# Patient Record
Sex: MALE | Race: BLACK OR AFRICAN AMERICAN | URBAN - METROPOLITAN AREA
[De-identification: names, ages, dates, MRNs, and addresses within clinical notes are randomized per-mention and may not be internally consistent; named-entity substitution may affect disease eponyms.]

---

## 2017-01-19 DIAGNOSIS — E78.1 HYPERTRIGLYCERIDEMIA: ICD-10-CM

## 2017-01-20 RX ORDER — SIMVASTATIN 10 MG/1
TABLET, FILM COATED ORAL
Qty: 30 TAB | Refills: 0 | Status: SHIPPED | OUTPATIENT
Start: 2017-01-20 | End: 2017-08-11 | Stop reason: SDUPTHER

## 2017-01-20 NOTE — TELEPHONE ENCOUNTER
Alan Gómez,   Please tell the patient to follow up in February. After this refill I will no longer fill his simvastatin. Thanks.   JAMIE

## 2017-02-07 ENCOUNTER — OFFICE VISIT (OUTPATIENT)
Dept: FAMILY MEDICINE CLINIC | Age: 32
End: 2017-02-07

## 2017-02-07 ENCOUNTER — HOSPITAL ENCOUNTER (OUTPATIENT)
Dept: LAB | Age: 32
Discharge: HOME | End: 2017-02-07
Payer: COMMERCIAL

## 2017-02-07 VITALS
SYSTOLIC BLOOD PRESSURE: 127 MMHG | HEART RATE: 94 BPM | BODY MASS INDEX: 47.74 KG/M2 | HEIGHT: 68 IN | OXYGEN SATURATION: 97 % | WEIGHT: 315 LBS | DIASTOLIC BLOOD PRESSURE: 87 MMHG | TEMPERATURE: 97.5 F | RESPIRATION RATE: 17 BRPM

## 2017-02-07 DIAGNOSIS — E11.69 DIABETES MELLITUS TYPE 2 IN OBESE (HCC): ICD-10-CM

## 2017-02-07 DIAGNOSIS — E66.01 MORBID OBESITY DUE TO EXCESS CALORIES (HCC): ICD-10-CM

## 2017-02-07 DIAGNOSIS — E11.9 ENCOUNTER FOR DIABETIC FOOT EXAM (HCC): ICD-10-CM

## 2017-02-07 DIAGNOSIS — E78.1 HYPERTRIGLYCERIDEMIA: ICD-10-CM

## 2017-02-07 DIAGNOSIS — E55.9 VITAMIN D DEFICIENCY: ICD-10-CM

## 2017-02-07 DIAGNOSIS — E66.9 DIABETES MELLITUS TYPE 2 IN OBESE (HCC): Primary | ICD-10-CM

## 2017-02-07 DIAGNOSIS — E66.9 DIABETES MELLITUS TYPE 2 IN OBESE (HCC): ICD-10-CM

## 2017-02-07 DIAGNOSIS — E11.69 DIABETES MELLITUS TYPE 2 IN OBESE (HCC): Primary | ICD-10-CM

## 2017-02-07 LAB
ALBUMIN SERPL BCP-MCNC: 3.7 G/DL (ref 3.4–5)
ALBUMIN/GLOB SERPL: 1 {RATIO} (ref 0.8–1.7)
ALP SERPL-CCNC: 119 U/L (ref 45–117)
ALT SERPL-CCNC: 36 U/L (ref 16–61)
ANION GAP BLD CALC-SCNC: 8 MMOL/L (ref 3–18)
AST SERPL W P-5'-P-CCNC: 15 U/L (ref 15–37)
BASOPHILS # BLD AUTO: 0 K/UL (ref 0–0.06)
BASOPHILS # BLD: 0 % (ref 0–2)
BILIRUB SERPL-MCNC: 0.6 MG/DL (ref 0.2–1)
BUN SERPL-MCNC: 12 MG/DL (ref 7–18)
BUN/CREAT SERPL: 13 (ref 12–20)
CALCIUM SERPL-MCNC: 9.3 MG/DL (ref 8.5–10.1)
CHLORIDE SERPL-SCNC: 101 MMOL/L (ref 100–108)
CO2 SERPL-SCNC: 30 MMOL/L (ref 21–32)
CREAT SERPL-MCNC: 0.9 MG/DL (ref 0.6–1.3)
DIFFERENTIAL METHOD BLD: NORMAL
EOSINOPHIL # BLD: 0.2 K/UL (ref 0–0.4)
EOSINOPHIL NFR BLD: 2 % (ref 0–5)
ERYTHROCYTE [DISTWIDTH] IN BLOOD BY AUTOMATED COUNT: 14.5 % (ref 11.6–14.5)
EST. AVERAGE GLUCOSE BLD GHB EST-MCNC: 166 MG/DL
GLOBULIN SER CALC-MCNC: 3.6 G/DL (ref 2–4)
GLUCOSE SERPL-MCNC: 187 MG/DL (ref 74–99)
HBA1C MFR BLD: 7.4 % (ref 4.2–5.6)
HCT VFR BLD AUTO: 42.7 % (ref 36–48)
HGB BLD-MCNC: 13.6 G/DL (ref 13–16)
LYMPHOCYTES # BLD AUTO: 24 % (ref 21–52)
LYMPHOCYTES # BLD: 1.7 K/UL (ref 0.9–3.6)
MCH RBC QN AUTO: 26.6 PG (ref 24–34)
MCHC RBC AUTO-ENTMCNC: 31.9 G/DL (ref 31–37)
MCV RBC AUTO: 83.4 FL (ref 74–97)
MONOCYTES # BLD: 0.6 K/UL (ref 0.05–1.2)
MONOCYTES NFR BLD AUTO: 8 % (ref 3–10)
NEUTS SEG # BLD: 4.7 K/UL (ref 1.8–8)
NEUTS SEG NFR BLD AUTO: 66 % (ref 40–73)
PLATELET # BLD AUTO: 307 K/UL (ref 135–420)
PMV BLD AUTO: 11.7 FL (ref 9.2–11.8)
POTASSIUM SERPL-SCNC: 4.4 MMOL/L (ref 3.5–5.5)
PROT SERPL-MCNC: 7.3 G/DL (ref 6.4–8.2)
RBC # BLD AUTO: 5.12 M/UL (ref 4.7–5.5)
SODIUM SERPL-SCNC: 139 MMOL/L (ref 136–145)
WBC # BLD AUTO: 7.2 K/UL (ref 4.6–13.2)

## 2017-02-07 PROCEDURE — 85025 COMPLETE CBC W/AUTO DIFF WBC: CPT | Performed by: INTERNAL MEDICINE

## 2017-02-07 PROCEDURE — 82043 UR ALBUMIN QUANTITATIVE: CPT | Performed by: INTERNAL MEDICINE

## 2017-02-07 PROCEDURE — 83036 HEMOGLOBIN GLYCOSYLATED A1C: CPT | Performed by: INTERNAL MEDICINE

## 2017-02-07 PROCEDURE — 82306 VITAMIN D 25 HYDROXY: CPT | Performed by: INTERNAL MEDICINE

## 2017-02-07 PROCEDURE — 36415 COLL VENOUS BLD VENIPUNCTURE: CPT | Performed by: INTERNAL MEDICINE

## 2017-02-07 PROCEDURE — 80061 LIPID PANEL: CPT | Performed by: INTERNAL MEDICINE

## 2017-02-07 PROCEDURE — 80053 COMPREHEN METABOLIC PANEL: CPT | Performed by: INTERNAL MEDICINE

## 2017-02-07 PROCEDURE — 82172 ASSAY OF APOLIPOPROTEIN: CPT | Performed by: INTERNAL MEDICINE

## 2017-02-07 RX ORDER — METFORMIN HYDROCHLORIDE 500 MG/1
500 TABLET ORAL
Qty: 30 TAB | Refills: 3 | Status: SHIPPED | OUTPATIENT
Start: 2017-02-07 | End: 2017-02-16 | Stop reason: SDUPTHER

## 2017-02-07 NOTE — PATIENT INSTRUCTIONS
Please contact our office if you have any questions about your visit today. 1.) Please get labs a week before next visit. 2.) Return in 3 months for follow up.      3.) Try to mix up exercising including elliptical, row machine and circuit training.     4.) They will call about referral for eye and foot doctors.

## 2017-02-07 NOTE — PROGRESS NOTES
Progress Note    Patient: Noah Howard MRN: [id number]  SSN: [id number]    YOB: 1985  Age: 28 y.o. Sex: male          Subjective:   Noah Howard is a 28 y.o. male who is here for follow up. Patient states that he has been playing flag football every Friday. He states that he has been working out regularly. He states that he has been having difficulty seeing the nutritionist. He states that he has been trying to cut out sugary diet. He states that the holidays were hard in terms of keeping with his diet. He denies any doctors visit. He has been out of his Metformin for about two weeks. Patient mentions that he eats something every two hours. He states that he has tried to cut back on seconds and also on portion sizes. Objective:     Past Medical History   Diagnosis Date    IBS (irritable bowel syndrome)       Visit Vitals    /87 (BP 1 Location: Right arm, BP Patient Position: Sitting)    Pulse 94    Temp 97.5 °F (36.4 °C) (Oral)    Resp 17    Ht 5' 8\" (1.727 m)    Wt (!) 394 lb (178.7 kg)    SpO2 97%    BMI 59.91 kg/m2         Review of Systems:  Pertinent ROS noted in HPI    Physical Exam:     GENERAL: alert, cooperative, no distress, appears stated age, morbidly obese  EYE: conjunctivae/corneas clear. PERRL, EOM's intact. Fundi benign  LYMPHATIC: Cervical, supraclavicular, and axillary nodes normal.   THROAT & NECK: normal and no erythema or exudates noted. LUNG: clear to auscultation bilaterally  HEART: regular rate and rhythm, S1, S2 normal, no murmur, click, rub or gallop  ABDOMEN: soft, non-tender. Bowel sounds normal. No masses,  no organomegaly, abnormal findings:  obese  EXTREMITIES:  extremities normal, atraumatic, no cyanosis or edema. Feet examined---no lesions. SKIN: Normal.  NEURO: Pinprick sensation adequate in both feet. Lab/Data Review:  No new labs to review     Assessment:     1.) Diabetes Mellitus Type 2: HgbA1C drawn in the office today.  His, Metformin--just 1 pill once a day--was reordered. If level is less than 6 then will transition him off of medications and he will continue lifestyle change. 2.) Hypertriglyceridemia: Lipid panel drawn in the office today. He will continue on statin in the interim. 3.) Obesity: Patient encouraged to engage in lifestyle change and to modify his exercises. 4.) Vitamin D Deficiency:  Vitamin D level drawn in the office today. 5.) Preventive: Podiatry and ophthalmology referrals placed. Labs drawn today. Patient will return in 3 months for follow up.        Plan:     Orders Placed This Encounter    METABOLIC PANEL, COMPREHENSIVE    CBC WITH AUTOMATED DIFF    HEMOGLOBIN A1C WITH EAG    LIPID CASCADE W/REFLEX APO B    VITAMIN D, 25 HYDROXY    MICROALBUMIN, UR, RAND W/ MICROALBUMIN/CREA RATIO    REFERRAL TO OPHTHALMOLOGY    REFERRAL TO PODIATRY    metFORMIN (GLUCOPHAGE) 500 mg tablet         Signed By: Kavita Mark DO     February 7, 2017

## 2017-02-07 NOTE — MR AVS SNAPSHOT
Visit Information Date & Time Provider Department Dept. Phone Encounter #  
 2/7/2017  8:30 AM Becky Latham 77 [id number] Follow-up Instructions Return in about 3 months (around 5/7/2017) for Regular Follow Up. Upcoming Health Maintenance Date Due Pneumococcal 19-64 Medium Risk (1 of 1 - PPSV23) 8/8/2005 DTaP/Tdap/Td series (1 - Tdap) 8/8/2007 EYE EXAM RETINAL OR DILATED Q1 12/4/2015 FOOT EXAM Q1 6/4/2016 MICROALBUMIN Q1 6/4/2016 HEMOGLOBIN A1C Q6M 11/19/2016 LIPID PANEL Q1 5/19/2017 Allergies as of 2/7/2017  Review Complete On: 7/9/9208 By: Timoteo Elaine LPN No Known Allergies Current Immunizations  Never Reviewed Name Date Influenza Vaccine 9/1/2014 Not reviewed this visit You Were Diagnosed With   
  
 Codes Comments Diabetes mellitus type 2 in obese (HCC)    -  Primary ICD-10-CM: E11.9, E66.9 ICD-9-CM: 250.00, 278.00 Vitamin D deficiency     ICD-10-CM: E55.9 ICD-9-CM: 268.9 Hypertriglyceridemia     ICD-10-CM: E78.1 ICD-9-CM: 272.1 Morbid obesity due to excess calories (HCC)     ICD-10-CM: E66.01 
ICD-9-CM: 278.01 En[facility]     ICD-10-CM: E11.9 ICD-9-CM: 250.00 Vitals BP Pulse Temp Resp Height(growth percentile) Weight(growth percentile) 127/87 (BP 1 Location: Right arm, BP Patient Position: Sitting) 94 97.5 °F (36.4 °C) (Oral) 17 5' 8\" (1.727 m) (!) 394 lb (178.7 kg) SpO2 BMI Smoking Status 97% 59.91 kg/m2 Never Smoker BMI and BSA Data Body Mass Index Body Surface Area  
 59.91 kg/m 2 2.93 m 2 Preferred Pharmacy Pharmacy Name Phone [facility] [address] [phone] Your Updated Medication List  
  
   
This list is accurate as of: 2/7/17  9:05 AM.  Always use your most recent med list.  
  
  
 diclofenac 1 % Gel Commonly known as:  VOLTAREN Apply 4 g to affected area four (4) times daily as needed for Pain.  
  
 glucose blood VI test strips strip Commonly known as:  blood glucose test  
Check blood sugar after largest meal of the day. metFORMIN 500 mg tablet Commonly known as:  GLUCOPHAGE Take 1 Tab by mouth daily (with breakfast). * simvastatin 10 mg tablet Commonly known as:  ZOCOR Take 1 Tab by mouth nightly. Indications: HYPERTRIGLYCERIDEMIA * simvastatin 10 mg tablet Commonly known as:  ZOCOR  
TAKE 1 TABLET BY MOUTH EVERY NIGHT AT BEDTIME FOR HYPERTRIGLYCERIDEMIA * Notice: This list has 2 medication(s) that are the same as other medications prescribed for you. Read the directions carefully, and ask your doctor or other care provider to review them with you. Prescriptions Sent to Pharmacy Refills  
 metFORMIN (GLUCOPHAGE) 500 mg tablet 3 Sig: Take 1 Tab by mouth daily (with breakfast). Class: Normal  
 Pharmacy: [address] [place] [address] #: [phone] Route: Oral  
  
We Performed the Following REFERRAL TO OPHTHALMOLOGY [REF57 Custom] Comments:  
 Please evaluate patient for diabetic eye exam.  
 REFERRAL TO PODIATRY [REF90 Custom] Comments:  
 Please evaluate patient for diabetic foot exam.  
  
Follow-up Instructions Return in about 3 months (around 5/7/2017) for Regular Follow Up. To-Do List   
 02/07/2017 Lab:  HEMOGLOBIN A1C WITH EAG   
  
 02/07/2017 Lab:  LIPID CASCADE W/REFLEX APO B   
  
 02/07/2017 Lab:  MICROALBUMIN, UR, RAND W/ MICROALBUMIN/CREA RATIO   
  
 02/07/2017 Lab:  VITAMIN D, 25 HYDROXY Around 05/08/2017 Lab:  CBC WITH AUTOMATED DIFF Around 05/08/2017 Lab:  METABOLIC PANEL, COMPREHENSIVE Referral Information Referral ID Referred By Referred To [id number] [facility] [address] Phone: [phone] Fax: [phone] Visits Status Start Date End Date 1 New Request 2/7/17 2/7/18 If your referral has a status of pending review or denied, additional information will be sent to support the outcome of this decision. Referral ID Referred By Referred To  
 [id number] [facility] [place], [address] Phone: [phone] Fax: [phone] Visits Status Start Date End Date 1 New Request 2/7/17 2/7/18 If your referral has a status of pending review or denied, additional information will be sent to support the outcome of this decision. Patient Instructions Please contact our office if you have any questions about your visit today. 1.) Please get labs a week before next visit. 2.) Return in 3 months for follow up.   
 
3.) Try to mix up exercising including elliptical, row machine and circuit training.  
 
4.) They will call about referral for eye and foot doctors. Introducing [facility]! [place] introduces [employer] patient portal. Now you can access parts of your medical record, email your doctor's office, and request medication refills online. 1. In your internet browser, go to [email or web address] 2. Click on the First Time User? Click Here link in the Sign In box. You will see the New Member Sign Up page. 3. Enter your [employer] Access Code exactly as it appears below. You will not need to use this code after youve completed the sign-up process. If you do not sign up before the expiration date, you must request a new code. · [employer] Access Code: [email or web address] Ana Paula Basurto Expires: 5/8/2017  8:58 AM 
 
4.  Enter the last four digits of your Social Security Number (xxxx) and Date of Birth (mm/dd/yyyy) as indicated and click Submit. You will be taken to the next sign-up page. 5. Create a [employer] ID. This will be your [employer] login ID and cannot be changed, so think of one that is secure and easy to remember. 6. Create a [employer] password. You can change your password at any time. 7. Enter your Password Reset Question and Answer. This can be used at a later time if you forget your password. 8. Enter your e-mail address. You will receive e-mail notification when new information is available in [address] 9. Click Sign Up. You can now view and download portions of your medical record. 10. Click the Download Summary menu link to download a portable copy of your medical information. If you have questions, please visit the Frequently Asked Questions section of the [employer] website. Remember, [employer] is NOT to be used for urgent needs. For medical emergencies, dial 911. Now available from your iPhone and Android! Please provide this summary of care documentation to your next provider. Your primary care clinician is listed as Marilin Joyce. If you have any questions after today's visit, please call [phone].

## 2017-02-07 NOTE — PROGRESS NOTES
Chief Complaint   Patient presents with    Diabetes     states that he does not check his bg    Obesity    Medication Refill     needs refill of metformin     1. Have you been to the ER, urgent care clinic since your last visit? Hospitalized since your last visit? No    2. Have you seen or consulted any other health care providers outside of the [address] [place] [address] since your last visit? Include any pap smears or colon screening.  No

## 2017-02-08 LAB
25(OH)D3 SERPL-MCNC: 19.8 NG/ML (ref 30–100)
CREAT UR-MCNC: 136.27 MG/DL (ref 30–125)
MICROALBUMIN UR-MCNC: 6.1 MG/DL (ref 0–3)
MICROALBUMIN/CREAT UR-RTO: 45 MG/G (ref 0–30)

## 2017-02-11 LAB
APO B SERPL-MCNC: 81 MG/DL (ref 52–135)
CHOLEST SERPL-MCNC: 142 MG/DL (ref 100–199)
HDLC SERPL-MCNC: 39 MG/DL
LDLC SERPL CALC-MCNC: 70 MG/DL (ref 0–99)
LDLC/HDLC SERPL: 1.8 RATIO UNITS (ref 0–3.6)
NONHDLC SERPL-MCNC: 103 MG/DL (ref 0–129)
TRIGL SERPL-MCNC: 166 MG/DL (ref 0–149)

## 2017-02-16 ENCOUNTER — TELEPHONE (OUTPATIENT)
Dept: FAMILY MEDICINE CLINIC | Age: 32
End: 2017-02-16

## 2017-02-16 DIAGNOSIS — E11.69 DIABETES MELLITUS TYPE 2 IN OBESE (HCC): ICD-10-CM

## 2017-02-16 DIAGNOSIS — E66.9 DIABETES MELLITUS TYPE 2 IN OBESE (HCC): ICD-10-CM

## 2017-02-16 RX ORDER — METFORMIN HYDROCHLORIDE 500 MG/1
500 TABLET ORAL 2 TIMES DAILY WITH MEALS
Qty: 30 TAB | Refills: 3 | Status: SHIPPED | OUTPATIENT
Start: 2017-02-16 | End: 2017-05-12 | Stop reason: SDUPTHER

## 2017-02-16 NOTE — TELEPHONE ENCOUNTER
Alan Gómez,   Please let patient know that his A1C shot up to 7.4. I want him to go back to twice a day metformin. I will also order an additional thirty pills so that he can have enough to last a month. Thanks.     JAMIE

## 2017-02-17 ENCOUNTER — TELEPHONE (OUTPATIENT)
Dept: FAMILY MEDICINE CLINIC | Age: 32
End: 2017-02-17

## 2017-02-17 NOTE — TELEPHONE ENCOUNTER
Attempted to contact pt at  number, no answer. Lvm for pt to return call to office at [phone]. Will continue to try to contact pt.

## 2017-03-14 DIAGNOSIS — E11.69 DIABETES MELLITUS TYPE 2 IN OBESE (HCC): Primary | ICD-10-CM

## 2017-03-14 DIAGNOSIS — E66.9 DIABETES MELLITUS TYPE 2 IN OBESE (HCC): Primary | ICD-10-CM

## 2017-03-14 RX ORDER — LANCETS
EACH MISCELLANEOUS
Qty: 100 EACH | Refills: 11 | Status: SHIPPED | OUTPATIENT
Start: 2017-03-14

## 2017-05-12 DIAGNOSIS — E66.9 DIABETES MELLITUS TYPE 2 IN OBESE (HCC): ICD-10-CM

## 2017-05-12 DIAGNOSIS — E11.69 DIABETES MELLITUS TYPE 2 IN OBESE (HCC): ICD-10-CM

## 2017-05-15 RX ORDER — METFORMIN HYDROCHLORIDE 500 MG/1
TABLET ORAL
Qty: 30 TAB | Refills: 0 | Status: SHIPPED | OUTPATIENT
Start: 2017-05-15 | End: 2017-08-11 | Stop reason: DRUGHIGH

## 2017-08-11 ENCOUNTER — HOSPITAL ENCOUNTER (OUTPATIENT)
Dept: LAB | Age: 32
Discharge: HOME | End: 2017-08-11
Payer: COMMERCIAL

## 2017-08-11 ENCOUNTER — OFFICE VISIT (OUTPATIENT)
Dept: FAMILY MEDICINE CLINIC | Age: 32
End: 2017-08-11

## 2017-08-11 VITALS
HEART RATE: 85 BPM | HEIGHT: 68 IN | BODY MASS INDEX: 47.74 KG/M2 | DIASTOLIC BLOOD PRESSURE: 81 MMHG | SYSTOLIC BLOOD PRESSURE: 138 MMHG | WEIGHT: 315 LBS | OXYGEN SATURATION: 99 % | TEMPERATURE: 97.2 F

## 2017-08-11 DIAGNOSIS — E55.9 VITAMIN D DEFICIENCY: ICD-10-CM

## 2017-08-11 DIAGNOSIS — E11.69 DIABETES MELLITUS TYPE 2 IN OBESE (HCC): ICD-10-CM

## 2017-08-11 DIAGNOSIS — E11.69 DIABETES MELLITUS TYPE 2 IN OBESE (HCC): Primary | ICD-10-CM

## 2017-08-11 DIAGNOSIS — E78.1 HYPERTRIGLYCERIDEMIA: ICD-10-CM

## 2017-08-11 DIAGNOSIS — E66.01 MORBID OBESITY DUE TO EXCESS CALORIES (HCC): ICD-10-CM

## 2017-08-11 DIAGNOSIS — E66.9 DIABETES MELLITUS TYPE 2 IN OBESE (HCC): Primary | ICD-10-CM

## 2017-08-11 DIAGNOSIS — E66.9 DIABETES MELLITUS TYPE 2 IN OBESE (HCC): ICD-10-CM

## 2017-08-11 LAB
ALBUMIN SERPL BCP-MCNC: 3.6 G/DL (ref 3.4–5)
ALBUMIN/GLOB SERPL: 0.9 {RATIO} (ref 0.8–1.7)
ALP SERPL-CCNC: 120 U/L (ref 45–117)
ALT SERPL-CCNC: 36 U/L (ref 16–61)
ANION GAP BLD CALC-SCNC: 9 MMOL/L (ref 3–18)
AST SERPL W P-5'-P-CCNC: 15 U/L (ref 15–37)
BASOPHILS # BLD AUTO: 0 K/UL (ref 0–0.06)
BASOPHILS # BLD: 1 % (ref 0–2)
BILIRUB SERPL-MCNC: 0.7 MG/DL (ref 0.2–1)
BUN SERPL-MCNC: 12 MG/DL (ref 7–18)
BUN/CREAT SERPL: 12 (ref 12–20)
CALCIUM SERPL-MCNC: 8.7 MG/DL (ref 8.5–10.1)
CHLORIDE SERPL-SCNC: 100 MMOL/L (ref 100–108)
CHOLEST SERPL-MCNC: 131 MG/DL
CO2 SERPL-SCNC: 27 MMOL/L (ref 21–32)
CREAT SERPL-MCNC: 1.01 MG/DL (ref 0.6–1.3)
DIFFERENTIAL METHOD BLD: ABNORMAL
EOSINOPHIL # BLD: 0.1 K/UL (ref 0–0.4)
EOSINOPHIL NFR BLD: 2 % (ref 0–5)
ERYTHROCYTE [DISTWIDTH] IN BLOOD BY AUTOMATED COUNT: 14.9 % (ref 11.6–14.5)
ERYTHROCYTE [SEDIMENTATION RATE] IN BLOOD: 14 MM/HR (ref 0–15)
EST. AVERAGE GLUCOSE BLD GHB EST-MCNC: 214 MG/DL
GLOBULIN SER CALC-MCNC: 4 G/DL (ref 2–4)
GLUCOSE SERPL-MCNC: 262 MG/DL (ref 74–99)
HBA1C MFR BLD: 9.1 % (ref 4.2–5.6)
HCT VFR BLD AUTO: 43.3 % (ref 36–48)
HDLC SERPL-MCNC: 41 MG/DL (ref 40–60)
HDLC SERPL: 3.2 {RATIO} (ref 0–5)
HGB BLD-MCNC: 13.7 G/DL (ref 13–16)
LDLC SERPL CALC-MCNC: 52.6 MG/DL (ref 0–100)
LIPID PROFILE,FLP: ABNORMAL
LYMPHOCYTES # BLD AUTO: 24 % (ref 21–52)
LYMPHOCYTES # BLD: 1.8 K/UL (ref 0.9–3.6)
MCH RBC QN AUTO: 26 PG (ref 24–34)
MCHC RBC AUTO-ENTMCNC: 31.6 G/DL (ref 31–37)
MCV RBC AUTO: 82.3 FL (ref 74–97)
MONOCYTES # BLD: 0.6 K/UL (ref 0.05–1.2)
MONOCYTES NFR BLD AUTO: 8 % (ref 3–10)
NEUTS SEG # BLD: 5.2 K/UL (ref 1.8–8)
NEUTS SEG NFR BLD AUTO: 65 % (ref 40–73)
PLATELET # BLD AUTO: 271 K/UL (ref 135–420)
PMV BLD AUTO: 11.7 FL (ref 9.2–11.8)
POTASSIUM SERPL-SCNC: 4.2 MMOL/L (ref 3.5–5.5)
PROT SERPL-MCNC: 7.6 G/DL (ref 6.4–8.2)
RBC # BLD AUTO: 5.26 M/UL (ref 4.7–5.5)
SODIUM SERPL-SCNC: 136 MMOL/L (ref 136–145)
TRIGL SERPL-MCNC: 187 MG/DL (ref ?–150)
VLDLC SERPL CALC-MCNC: 37.4 MG/DL
WBC # BLD AUTO: 7.8 K/UL (ref 4.6–13.2)

## 2017-08-11 PROCEDURE — 85025 COMPLETE CBC W/AUTO DIFF WBC: CPT | Performed by: INTERNAL MEDICINE

## 2017-08-11 PROCEDURE — 80053 COMPREHEN METABOLIC PANEL: CPT | Performed by: INTERNAL MEDICINE

## 2017-08-11 PROCEDURE — 82306 VITAMIN D 25 HYDROXY: CPT | Performed by: INTERNAL MEDICINE

## 2017-08-11 PROCEDURE — 36415 COLL VENOUS BLD VENIPUNCTURE: CPT | Performed by: INTERNAL MEDICINE

## 2017-08-11 PROCEDURE — 80061 LIPID PANEL: CPT | Performed by: INTERNAL MEDICINE

## 2017-08-11 PROCEDURE — 83036 HEMOGLOBIN GLYCOSYLATED A1C: CPT | Performed by: INTERNAL MEDICINE

## 2017-08-11 PROCEDURE — 85652 RBC SED RATE AUTOMATED: CPT | Performed by: INTERNAL MEDICINE

## 2017-08-11 RX ORDER — METFORMIN HYDROCHLORIDE 1000 MG/1
1000 TABLET ORAL 2 TIMES DAILY WITH MEALS
Qty: 60 TAB | Refills: 3 | Status: SHIPPED | OUTPATIENT
Start: 2017-08-11 | End: 2017-10-27 | Stop reason: DRUGHIGH

## 2017-08-11 RX ORDER — SIMVASTATIN 10 MG/1
TABLET, FILM COATED ORAL
Qty: 30 TAB | Refills: 3 | Status: SHIPPED | OUTPATIENT
Start: 2017-08-11 | End: 2017-12-17 | Stop reason: SDUPTHER

## 2017-08-11 RX ORDER — METFORMIN HYDROCHLORIDE 500 MG/1
TABLET ORAL
Qty: 60 TAB | Refills: 3 | Status: CANCELLED | OUTPATIENT
Start: 2017-08-11

## 2017-08-11 NOTE — PROGRESS NOTES
Progress Note    Patient: Joaquina Mcclain MRN: [id number]  SSN: [id number]    YOB: 1985  Age: 33 y.o. Sex: male          Subjective:   Joaquina Mcclain is a 33 y.o. male who is here for follow up. The patient recently had a baby with his wife. The patient mentions that he is concerned about his blood sugar. He states that he is trying to make a lifestyle change. He states that he has lost about 12 lbs over the course of 1 month. He also mentions that he had a blood sugar spike over 300 mg/dL. The patient has been without his metformin since about the middle of June. He states that he has ryanne trying to be physically active---engaging in activities such as flag football. He has also started eating healthier foods and eating more vegetables. He mentions that he works out three times a week. He mentions that he works primarily with his . Patient denies any headaches, but has had increased urination. He mentions that his nocturia has been intermittent. The patient mentions that he would like to lose 100 lbs overall. He states that he would like to get to 350 lb. The patient mentions that he would like to get down to the 350 lb mercy by November of this year. Objective:     Past Medical History:   Diagnosis Date    IBS (irritable bowel syndrome)       Visit Vitals    /81    Pulse 85    Temp 97.2 °F (36.2 °C) (Oral)    Ht 5' 8\" (1.727 m)    Wt (!) 383 lb (173.7 kg)    SpO2 99%    BMI 58.23 kg/m2       Review of Systems:  Pertinent ROS noted in HPI    Physical Exam:     GENERAL: alert, cooperative, no distress, appears stated age, morbidly obese  EYE: conjunctivae/corneas clear. PERRL, EOM's intact. Fundi benign  LYMPHATIC: Cervical, supraclavicular, and axillary nodes normal.   THROAT & NECK: normal and no erythema or exudates noted.    LUNG: clear to auscultation bilaterally  HEART: regular rate and rhythm, S1, S2 normal, no murmur, click, rub or gallop  ABDOMEN: soft, non-tender. Bowel sounds normal. No masses,  no organomegaly, abnormal findings:  obese  EXTREMITIES:  extremities normal, atraumatic, no cyanosis or edema. Feet examined---no lesions. SKIN: Normal.        Lab/Data Review:  Labs ordered as noted below     Assessment:     1.) Diabetes Mellitus Type 2: Due to the fact that the patient has been off of medication for a while and has been experiencing blood sugar spikes in the 300's I will increase his metformin to 1,000 mg twice a day. He was also provided information material on proper diet and weight loss. 2.) Hypertriglyceridemia: Lipid panel drawn in the office today. Simvastatin reordered. 3.) Obesity: Patient will continue to engage in lifestyle change. 4.) Vitamin D Deficiency:  Vitamin D level drawn in the office today. 5.) Preventive: Other labs drawn today. Patient will return in 3 months for follow up.        Plan:     Orders Placed This Encounter    METABOLIC PANEL, COMPREHENSIVE    LIPID PANEL    CBC WITH AUTOMATED DIFF    SED RATE (ESR)    HEMOGLOBIN A1C WITH EAG    VITAMIN D, 25 HYDROXY    simvastatin (ZOCOR) 10 mg tablet    metFORMIN (GLUCOPHAGE) 1,000 mg tablet         Signed By: Guillermo Christianson DO     August 11, 2017

## 2017-08-11 NOTE — MR AVS SNAPSHOT
Visit Information Date & Time Provider Department Dept. Phone Encounter #  
 8/11/2017  8:15 AM [id number] Becky Schmidt 77 [id number] Follow-up Instructions Return in about 3 months (around 11/11/2017) for Regular Follow Up . Upcoming Health Maintenance Date Due Pneumococcal 19-64 Medium Risk (1 of 1 - PPSV23) 8/8/2005 DTaP/Tdap/Td series (1 - Tdap) 8/8/2007 EYE EXAM RETINAL OR DILATED Q1 12/4/2015 INFLUENZA AGE 9 TO ADULT 8/1/2017 HEMOGLOBIN A1C Q6M 8/7/2017 FOOT EXAM Q1 2/7/2018 MICROALBUMIN Q1 2/7/2018 LIPID PANEL Q1 2/7/2018 Allergies as of 8/11/2017  Review Complete On: 8/11/2017 By: Rey Howard LPN No Known Allergies Current Immunizations  Never Reviewed Name Date Influenza Vaccine 9/1/2014 Not reviewed this visit You Were Diagnosed With   
  
 Codes Comments Diabetes mellitus type 2 in obese (HCC)    -  Primary ICD-10-CM: E11.9, E66.9 ICD-9-CM: 250.00, 278.00 Morbid obesity due to excess calories (HCC)     ICD-10-CM: E66.01 
ICD-9-CM: 278.01 Vitamin D deficiency     ICD-10-CM: E55.9 ICD-9-CM: 268.9 Hypertriglyceridemia     ICD-10-CM: E78.1 ICD-9-CM: 272.1 Vitals BP Pulse Temp Height(growth percentile) Weight(growth percentile) SpO2  
 138/81 85 97.2 °F (36.2 °C) (Oral) 5' 8\" (1.727 m) (!) 383 lb (173.7 kg) 99% BMI Smoking Status 58.23 kg/m2 Never Smoker BMI and BSA Data Body Mass Index Body Surface Area  
 58.23 kg/m 2 2.89 m 2 Preferred Pharmacy Pharmacy Name Phone [facility], [address] [phone] Your Updated Medication List  
  
   
This list is accurate as of: 8/11/17  8:55 AM.  Always use your most recent med list.  
  
  
  
  
 diclofenac 1 % Gel Commonly known as:  VOLTAREN  
 Apply 4 g to affected area four (4) times daily as needed for Pain.  
  
 glucose blood VI test strips strip Commonly known as:  CONTOUR NEXT STRIPS Check Blood Sugar after largest meal of the day. Lancets Misc Commonly known as:  Wilhemena Miracle Check blood sugar after largest meal of the day  
  
 metFORMIN 1,000 mg tablet Commonly known as:  GLUCOPHAGE Take 1 Tab by mouth two (2) times daily (with meals). simvastatin 10 mg tablet Commonly known as:  ZOCOR  
TAKE 1 TABLET BY MOUTH EVERY NIGHT AT BEDTIME FOR HYPERTRIGLYCERIDEMIA Prescriptions Sent to Pharmacy Refills  
 simvastatin (ZOCOR) 10 mg tablet 3 Sig: TAKE 1 TABLET BY MOUTH EVERY NIGHT AT BEDTIME FOR HYPERTRIGLYCERIDEMIA Class: Normal  
 Pharmacy: [facility] [place] [address] Ph #: [phone]  
 metFORMIN (GLUCOPHAGE) 1,000 mg tablet 3 Sig: Take 1 Tab by mouth two (2) times daily (with meals). Class: Normal  
 Pharmacy: [address] [place] [address], [address] [place] [address] Ph #: [phone] Route: Oral  
  
Follow-up Instructions Return in about 3 months (around 11/11/2017) for Regular Follow Up . To-Do List   
 08/11/2017 Lab:  HEMOGLOBIN A1C WITH EAG   
  
 08/11/2017 Lab:  VITAMIN D, 25 HYDROXY Around 11/09/2017 Lab:  CBC WITH AUTOMATED DIFF Around 11/09/2017 Lab:  LIPID PANEL Around 11/09/2017 Lab:  METABOLIC PANEL, COMPREHENSIVE Around 11/09/2017 Lab:  SED RATE (ESR) Patient Instructions 1.) Please get labs a week before next visit 2.) Return in 3 months for follow up Please contact our office if you have any questions about your visit today. Introducing [facility]!    
 [place] introduces [employer] patient portal. Now you can access parts of your medical record, email your doctor's office, and request medication refills online. 1. In your internet browser, go to [email or web address] 2. Click on the First Time User? Click Here link in the Sign In box. You will see the New Member Sign Up page. 3. Enter your [employer] Access Code exactly as it appears below. You will not need to use this code after youve completed the sign-up process. If you do not sign up before the expiration date, you must request a new code. · [employer] Access Code: [email or web address] Expires: 11/9/2017  8:03 AM 
 
4. Enter the last four digits of your Social Security Number (xxxx) and Date of Birth (mm/dd/yyyy) as indicated and click Submit. You will be taken to the next sign-up page. 5. Create a [employer] ID. This will be your [employer] login ID and cannot be changed, so think of one that is secure and easy to remember. 6. Create a [employer] password. You can change your password at any time. 7. Enter your Password Reset Question and Answer. This can be used at a later time if you forget your password. 8. Enter your e-mail address. You will receive e-mail notification when new information is available in [address] 9. Click Sign Up. You can now view and download portions of your medical record. 10. Click the Download Summary menu link to download a portable copy of your medical information. If you have questions, please visit the Frequently Asked Questions section of the [employer] website. Remember, [employer] is NOT to be used for urgent needs. For medical emergencies, dial 911. Now available from your iPhone and Android! Please provide this summary of care documentation to your next provider. Your primary care clinician is listed as Parish Thakur. If you have any questions after today's visit, please call [phone].

## 2017-08-11 NOTE — PROGRESS NOTES
Chief Complaint   Patient presents with    Diabetes     states has not taken Metformin since April 2017, problems getting it, blood sugar last night 300    Obesity    Cholesterol Problem    Vitamin D Deficiency     1. Have you been to the ER, urgent care clinic since your last visit? Hospitalized since your last visit? No    2. Have you seen or consulted any other health care providers outside of the [facility] since your last visit? Include any pap smears or colon screening.  No

## 2017-08-12 LAB — 25(OH)D3 SERPL-MCNC: 27.4 NG/ML (ref 30–100)

## 2017-10-31 ENCOUNTER — OFFICE VISIT (OUTPATIENT)
Dept: FAMILY MEDICINE CLINIC | Age: 32
End: 2017-10-31

## 2017-10-31 ENCOUNTER — HOSPITAL ENCOUNTER (OUTPATIENT)
Dept: LAB | Age: 32
Discharge: HOME | End: 2017-10-31
Payer: COMMERCIAL

## 2017-10-31 VITALS
BODY MASS INDEX: 47.74 KG/M2 | HEART RATE: 86 BPM | WEIGHT: 315 LBS | TEMPERATURE: 97.2 F | RESPIRATION RATE: 17 BRPM | OXYGEN SATURATION: 100 % | HEIGHT: 68 IN | DIASTOLIC BLOOD PRESSURE: 76 MMHG | SYSTOLIC BLOOD PRESSURE: 124 MMHG

## 2017-10-31 DIAGNOSIS — R25.2 MUSCLE CRAMPS: ICD-10-CM

## 2017-10-31 DIAGNOSIS — E66.01 MORBID OBESITY DUE TO EXCESS CALORIES (HCC): ICD-10-CM

## 2017-10-31 DIAGNOSIS — E66.9 DIABETES MELLITUS TYPE 2 IN OBESE (HCC): Primary | ICD-10-CM

## 2017-10-31 DIAGNOSIS — E78.1 HYPERTRIGLYCERIDEMIA: ICD-10-CM

## 2017-10-31 DIAGNOSIS — E11.69 DIABETES MELLITUS TYPE 2 IN OBESE (HCC): Primary | ICD-10-CM

## 2017-10-31 LAB
ALBUMIN SERPL-MCNC: 3.6 G/DL (ref 3.4–5)
ALBUMIN/GLOB SERPL: 0.9 {RATIO} (ref 0.8–1.7)
ALP SERPL-CCNC: 103 U/L (ref 45–117)
ALT SERPL-CCNC: 24 U/L (ref 16–61)
ANION GAP SERPL CALC-SCNC: 8 MMOL/L (ref 3–18)
AST SERPL-CCNC: 12 U/L (ref 15–37)
BILIRUB SERPL-MCNC: 0.5 MG/DL (ref 0.2–1)
BUN SERPL-MCNC: 11 MG/DL (ref 7–18)
BUN/CREAT SERPL: 12 (ref 12–20)
CALCIUM SERPL-MCNC: 8.6 MG/DL (ref 8.5–10.1)
CHLORIDE SERPL-SCNC: 104 MMOL/L (ref 100–108)
CO2 SERPL-SCNC: 29 MMOL/L (ref 21–32)
CREAT SERPL-MCNC: 0.94 MG/DL (ref 0.6–1.3)
GLOBULIN SER CALC-MCNC: 3.9 G/DL (ref 2–4)
GLUCOSE SERPL-MCNC: 142 MG/DL (ref 74–99)
POTASSIUM SERPL-SCNC: 4.5 MMOL/L (ref 3.5–5.5)
PROT SERPL-MCNC: 7.5 G/DL (ref 6.4–8.2)
SODIUM SERPL-SCNC: 141 MMOL/L (ref 136–145)

## 2017-10-31 PROCEDURE — 80053 COMPREHEN METABOLIC PANEL: CPT | Performed by: INTERNAL MEDICINE

## 2017-10-31 PROCEDURE — 36415 COLL VENOUS BLD VENIPUNCTURE: CPT | Performed by: INTERNAL MEDICINE

## 2017-10-31 PROCEDURE — 82550 ASSAY OF CK (CPK): CPT | Performed by: INTERNAL MEDICINE

## 2017-10-31 NOTE — PROGRESS NOTES
Progress Note    Patient: Estrellita Munguia MRN: [id number]  SSN: [id number]    YOB: 1985  Age: 33 y.o. Sex: male          Subjective:   Estrellita Munguia is a 33 y.o. male who is here for follow up. The patient previously was having diarrhea and nausea with the BID Metformin 1000 mg. He states that when he increased the dose he felt very nauseous. He recently was switched to the Metformin 750 XR once a day and he has tolerated it well so far. When he started back on Metformin his BS dropped down to 110 fasting. He has not checked his BS recently. He also mentions that he took a break for a week from his regular exercise regimen. He was sore during this time and took this time off. He mentions that he recently started doing some bench presses but nothing else. The patient develops cramping when he walks a lot. He states that when he goes into the mall he can get these cramping episodes. He states that when he is at rest or asleep he does NOT get the cramps. He denies getting enough fluid prior to walking long distances. He states that the muscles will lock up in his calves. The patient also notes that he has had some family members who have had gastric bypass with varying results. Some of his relatives actually regained some of the weight that they lost back. Objective:     Past Medical History:   Diagnosis Date    IBS (irritable bowel syndrome)       Visit Vitals    /76 (BP 1 Location: Right arm, BP Patient Position: Sitting)    Pulse 86    Temp 97.2 °F (36.2 °C) (Oral)    Resp 17    Ht 5' 8\" (1.727 m)    Wt (!) 389 lb (176.4 kg)    SpO2 100%    BMI 59.15 kg/m2       Review of Systems:  Pertinent ROS noted in HPI    Physical Exam:     GENERAL: alert, cooperative, no distress, appears stated age, morbidly obese  EYE: conjunctivae/corneas clear. PERRL, EOM's intact.  Fundi benign  LYMPHATIC: Cervical, supraclavicular, and axillary nodes normal.   THROAT & NECK: normal and no erythema or exudates noted. LUNG: clear to auscultation bilaterally  HEART: regular rate and rhythm, S1, S2 normal, no murmur, click, rub or gallop  ABDOMEN: soft, non-tender. Bowel sounds normal. No masses,  no organomegaly, abnormal findings:  obese  EXTREMITIES:  extremities normal, atraumatic, no cyanosis or edema. Feet examined---no lesions. SKIN: Normal.        Lab/Data Review:  Labs ordered as noted below     Assessment:     1.) Diabetes Mellitus Type 2: The patient will continue on Metformin 750 mg XR for management. The patient will also continue to exercise and watch his diet. He was also provided information material on proper exercise strategies. 2.) Muscle Cramping: Likely related to patient's exercising. I will still get CK isoenzymes to rule out rhabdomyolysis and CMP to rule out hypokalemia. 3.) Hypertriglyceridemia: Patient will continue on simvastatin. 4.) Obesity: Patient not interested in weight loss surgery at this point. He will continue to engage in lifestyle change. Patient will return in 3 months for follow up.        Plan:     Orders Placed This Encounter    METABOLIC PANEL, COMPREHENSIVE    CK ISOENZYMES         Signed By: Yeny Sarmiento DO     October 31, 2017

## 2017-10-31 NOTE — PROGRESS NOTES
Chief Complaint   Patient presents with    Diabetes      states that since restarting the metformin his sugars have been 110's before breakfast     1. Have you been to the ER, urgent care clinic since your last visit? Hospitalized since your last visit? No    2. Have you seen or consulted any other health care providers outside of the [address] [place] [address] since your last visit? Include any pap smears or colon screening.  No

## 2017-10-31 NOTE — MR AVS SNAPSHOT
Visit Information Date & Time Provider Department Dept. Phone Encounter #  
 10/31/2017  8:30 AM Sharyle Addison [facility] [phone] [id number] Follow-up Instructions Return in about 3 months (around 1/31/2018) for Regular Folow up . Your Appointments 11/10/2017  8:00 AM  
Follow Up with Kaylin Rice DO [facility] (--) Appt Note: 3 month fu  
 [place] [address] [address] [address] [phone]  
  
   
 Junior 58 [id number] [address] [address] Upcoming Health Maintenance Date Due Pneumococcal 19-64 Medium Risk (1 of 1 - PPSV23) 8/8/2005 DTaP/Tdap/Td series (1 - Tdap) 8/8/2007 EYE EXAM RETINAL OR DILATED Q1 12/4/2015 INFLUENZA AGE 9 TO ADULT 8/1/2017 FOOT EXAM Q1 2/7/2018 MICROALBUMIN Q1 2/7/2018 HEMOGLOBIN A1C Q6M 2/11/2018 LIPID PANEL Q1 8/11/2018 Allergies as of 10/31/2017  Review Complete On: 64/41/2885 By: Ryann Kapadia LPN No Known Allergies Current Immunizations  Never Reviewed Name Date Influenza Vaccine 9/1/2014 Not reviewed this visit You Were Diagnosed With   
  
 Codes Comments Diabetes mellitus type 2 in obese [facility])    -  Primary ICD-10-CM: E11.69, E66.9 ICD-9-CM: 250.00, 278.00 Hypertriglyceridemia     ICD-10-CM: E78.1 ICD-9-CM: 272.1 Morbid obesity due to excess calories ([facility] [address]     ICD-10-CM: E66.01 
ICD-9-CM: 278.01   
 Muscle cramps     ICD-10-CM: R25.2 ICD-9-CM: 729.82 Vitals BP Pulse Temp Resp Height(growth percentile) Weight(growth percentile) 124/76 (BP 1 Location: Right arm, BP Patient Position: Sitting) 86 97.2 °F (36.2 °C) (Oral) 17 5' 8\" (1.727 m) (!) 389 lb (176.4 kg) SpO2 BMI Smoking Status 100% 59.15 kg/m2 Never Smoker BMI and BSA Data Body Mass Index Body Surface Area  
 59.15 kg/m 2 2.91 m 2 Preferred Pharmacy Pharmacy Name Phone [facility] [address] [phone] Your Updated Medication List  
  
   
This list is accurate as of: 10/31/17  9:05 AM.  Always use your most recent med list.  
  
  
  
  
 diclofenac 1 % Gel Commonly known as:  VOLTAREN Apply 4 g to affected area four (4) times daily as needed for Pain.  
  
 glucose blood VI test strips strip Commonly known as:  CONTOUR NEXT STRIPS Check Blood Sugar after largest meal of the day. Lancets Misc Commonly known as:  Stvean [place] Check blood sugar after largest meal of the day  
  
 metFORMIN  mg tablet Commonly known as:  GLUCOPHAGE XR Take 1 Tab by mouth daily. simvastatin 10 mg tablet Commonly known as:  ZOCOR  
TAKE 1 TABLET BY MOUTH EVERY NIGHT AT BEDTIME FOR HYPERTRIGLYCERIDEMIA Follow-up Instructions Return in about 3 months (around 1/31/2018) for Regular Folow up . Patient Instructions Please contact our office if you have any questions about your visit today. 1.) Please get labs a week before next visit 2.) Return in 3 months for follow up Introducing [facility]! Sariah Loyola introduces Fit Fugitives patient portal. Now you can access parts of your medical record, email your doctor's office, and request medication refills online. 1. In your internet browser, go to [email or web address] 2. Click on the First Time User? Click Here link in the Sign In box. You will see the New Member Sign Up page. 3. Enter your [employer] F[employer]itives Access Code exactly as it appears below. You will not need to use this code after youve completed the sign-up process. If you do not sign up before the expiration date, you must request a new code. · [employer]itives Access Code: [email or web address] Expires: 11/9/2017  8:03 AM 
 
4.  Enter the last four digits of your Social Security Number (xxxx) and Date of Birth (mm/dd/yyyy) as indicated and click Submit. You will be taken to the next sign-up page. 5. Create a Kagera ID. This will be your Kagera login ID and cannot be changed, so think of one that is secure and easy to remember. 6. Create a Kagera password. You can change your password at any time. 7. Enter your Password Reset Question and Answer. This can be used at a later time if you forget your password. 8. Enter your e-mail address. You will receive e-mail notification when new information is available in [address] 9. Click Sign Up. You can now view and download portions of your medical record. 10. Click the Download Summary menu link to download a portable copy of your medical information. If you have questions, please visit the Frequently Asked Questions section of the Kagera website. Remember, Kagera is NOT to be used for urgent needs. For medical emergencies, dial 911. Now available from your iPhone and Android! Please provide this summary of care documentation to your next provider. Your primary care clinician is listed as Phoenix Lissette. If you have any questions after today's visit, please call [phone].

## 2017-11-02 ENCOUNTER — TELEPHONE (OUTPATIENT)
Dept: FAMILY MEDICINE CLINIC | Age: 32
End: 2017-11-02

## 2017-11-02 LAB
CK BB CFR SERPL ELPH: 0 %
CK MACRO1 CFR SERPL: 0 %
CK MACRO2 CFR SERPL: 0 %
CK MB CFR SERPL ELPH: 0 % (ref 0–3)
CK MM CFR SERPL ELPH: 100 % (ref 97–100)
CK SERPL-CCNC: 88 U/L (ref 24–204)

## 2017-11-02 NOTE — LETTER
11/10/2017 11:49 AM 
 
Mr. Abisai العلي [place] [address] Dear Abisai Fernandez I have reviewed your results and have found the results listed below to be within normal ranges. Cardiac Isoenzymes Please call if you have any questions [phone] . Sincerely, Osiris Samuels, DO

## 2017-12-17 DIAGNOSIS — E78.1 HYPERTRIGLYCERIDEMIA: ICD-10-CM

## 2017-12-18 RX ORDER — SIMVASTATIN 10 MG/1
TABLET, FILM COATED ORAL
Qty: 30 TAB | Refills: 0 | Status: SHIPPED | OUTPATIENT
Start: 2017-12-18 | End: 2018-01-28 | Stop reason: SDUPTHER

## 2018-01-28 DIAGNOSIS — E78.1 HYPERTRIGLYCERIDEMIA: ICD-10-CM

## 2018-01-29 RX ORDER — SIMVASTATIN 10 MG/1
TABLET, FILM COATED ORAL
Qty: 30 TAB | Refills: 0 | Status: SHIPPED | OUTPATIENT
Start: 2018-01-29 | End: 2018-03-01 | Stop reason: SDUPTHER

## 2018-03-01 DIAGNOSIS — E78.1 HYPERTRIGLYCERIDEMIA: ICD-10-CM

## 2018-03-02 RX ORDER — SIMVASTATIN 10 MG/1
TABLET, FILM COATED ORAL
Qty: 30 TAB | Refills: 0 | Status: SHIPPED | OUTPATIENT
Start: 2018-03-02 | End: 2018-04-07 | Stop reason: SDUPTHER

## 2018-03-02 RX ORDER — METFORMIN HYDROCHLORIDE 750 MG/1
TABLET, EXTENDED RELEASE ORAL
Qty: 30 TAB | Refills: 0 | Status: SHIPPED | OUTPATIENT
Start: 2018-03-02 | End: 2018-04-07 | Stop reason: SDUPTHER

## 2018-04-11 ENCOUNTER — OFFICE VISIT (OUTPATIENT)
Dept: FAMILY MEDICINE CLINIC | Age: 33
End: 2018-04-11

## 2018-04-11 VITALS
BODY MASS INDEX: 47.74 KG/M2 | DIASTOLIC BLOOD PRESSURE: 70 MMHG | WEIGHT: 315 LBS | RESPIRATION RATE: 20 BRPM | TEMPERATURE: 99 F | HEIGHT: 68 IN | SYSTOLIC BLOOD PRESSURE: 130 MMHG | HEART RATE: 84 BPM

## 2018-04-11 DIAGNOSIS — E55.9 VITAMIN D DEFICIENCY: ICD-10-CM

## 2018-04-11 DIAGNOSIS — E11.69 DIABETES MELLITUS TYPE 2 IN OBESE (HCC): Primary | ICD-10-CM

## 2018-04-11 DIAGNOSIS — E78.1 HYPERTRIGLYCERIDEMIA: ICD-10-CM

## 2018-04-11 DIAGNOSIS — E66.9 DIABETES MELLITUS TYPE 2 IN OBESE (HCC): Primary | ICD-10-CM

## 2018-04-11 DIAGNOSIS — E66.01 MORBID OBESITY DUE TO EXCESS CALORIES (HCC): ICD-10-CM

## 2018-04-11 NOTE — MR AVS SNAPSHOT
[facility] 
 
 
 Junior 58 [address] [address] [address] [phone] Patient: Casie Castrejon MRN: [id number] ANTOINE:[birth year] Visit Information Date & Time Provider Department Dept. Phone Encounter #  
 4/11/2018 11:15 AM Becky Head Martin [place] [address] [id number] Follow-up Instructions Return in about 2 weeks (around 4/27/2018) for Schedule for 8:15 am appointment . Upcoming Health Maintenance Date Due Pneumococcal 19-64 Medium Risk (1 of 1 - PPSV23) 8/8/2005 DTaP/Tdap/Td series (1 - Tdap) 8/8/2007 EYE EXAM RETINAL OR DILATED Q1 12/4/2015 Influenza Age 5 to Adult 8/1/2017 FOOT EXAM Q1 2/7/2018 MICROALBUMIN Q1 2/7/2018 HEMOGLOBIN A1C Q6M 2/11/2018 LIPID PANEL Q1 8/11/2018 Allergies as of 4/11/2018  Review Complete On: 10/31/2017 By: Bishnu Johnson, DO No Known Allergies Current Immunizations  Never Reviewed Name Date Influenza Vaccine 9/1/2014 Not reviewed this visit You Were Diagnosed With   
  
 Codes Comments Diabetes mellitus type 2 in obese [facility])    -  Primary ICD-10-CM: E11.69, E66.9 ICD-9-CM: 250.00, 278.00 Morbid obesity due to excess calories (HCC)     ICD-10-CM: E66.01 
ICD-9-CM: 278.01 Vitals BP Pulse Temp Resp Height(growth percentile) Weight(growth percentile) 130/70 (BP 1 Location: Left arm, BP Patient Position: Sitting) 84 99 °F (37.2 °C) (Oral) 20 5' 8\" (1.727 m) (!) 380 lb 4 oz (172.5 kg) BMI Smoking Status 57.82 kg/m2 Never Smoker BMI and BSA Data Body Mass Index Body Surface Area  
 57.82 kg/m 2 2.88 m 2 Preferred Pharmacy Pharmacy Name Phone [facility] [address], [address] [place] [address] [phone] Your Updated Medication List  
  
   
This list is accurate as of 4/11/18 12:43 PM.  Always use your most recent med list.  
  
  
 diclofenac 1 % Gel Commonly known as:  VOLTAREN Apply 4 g to affected area four (4) times daily as needed for Pain.  
  
 glucose blood VI test strips strip Commonly known as:  CONTOUR NEXT STRIPS Check Blood Sugar after largest meal of the day. Lancets Misc Commonly known as:  Caffie [place] Check blood sugar after largest meal of the day  
  
 metFORMIN  mg tablet Commonly known as:  GLUCOPHAGE XR  
TAKE 1 TABLET BY MOUTH DAILY  
  
 simvastatin 10 mg tablet Commonly known as:  ZOCOR  
TAKE 1 TABLET BY MOUTH EVERY NIGHT AT BEDTIME FOR HYPERTRIGLYCERIDEMIA SITagliptin-metFORMIN 100-1,000 mg Tm24 Commonly known as:  JANUMET XR Take 100 mg by mouth daily. Prescriptions Sent to Pharmacy Refills SITagliptin-metFORMIN (JANUMET XR) 100-1,000 mg TM24 0 Sig: Take 100 mg by mouth daily. Class: Normal  
 Pharmacy: [address] [place] [address], [address] [place] [address] #: [phone] Route: Oral  
  
Follow-up Instructions Return in about 2 weeks (around 4/27/2018) for Schedule for 8:15 am appointment . Patient Instructions Please contact our office if you have any questions about your visit today. 1.) Stop the Metformin. 2.) Start on Janumet XR once a day. 3.) Return in 2 weeks for follow up. Sitagliptin/Metformin (By mouth) Metformin Hydrochloride (met-FOR-min craig-droe-KLOR-mary), Sitagliptin Phosphate (sit-a-GLIP-tin FOS-fate) Treats type 2 diabetes. Brand Name(s): Janumet, Janumet XR There may be other brand names for this medicine. When This Medicine Should Not Be Used: This medicine is not right for everyone. Do not use it if you had an allergic reaction to metformin or sitagliptin. How to Use This Medicine:  
Tablet, Long Acting Tablet · Take your medicine as directed. Your dose may need to be changed several times to find what works best for you. · It is best to take this medicine with food or milk. · Extended-release tablet:  
¨ Take this medicine in the evening. ¨ You may see tablets in your stools. If this happens several times, tell your doctor right away. · Swallow the tablet whole. Do not crush, break, or chew it. · Drink plenty of fluids to help prevent dehydration. · This medicine should come with a Medication Guide. Ask your pharmacist for a copy if you do not have one. · Missed dose: Take a dose as soon as you remember. If it is almost time for your next dose, wait until then and take a regular dose. Do not take extra medicine to make up for a missed dose. · Store the medicine in a closed container at room temperature, away from heat, moisture, and direct light. Drugs and Foods to Avoid: Ask your doctor or pharmacist before using any other medicine, including over-the-counter medicines, vitamins, and herbal products. · Some medicines can affect how metformin/sitagliptin works. Tell your doctor if you are using any of the following: ¨ Cimetidine, digoxin, dolutegravir, morphine, phenytoin, quinine, ranitidine, ranolazine, vandetanib ¨ Blood pressure medicine (including amiloride) ¨ Diuretic (water pill, including acetazolamide, dichlorphenamide, triamterene) ¨ Insulin or other diabetes medicine ¨ Medicine for heart rhythm changes (including procainamide, quinidine) ¨ Medicine to treat infections (including isoniazid, rifampin, trimethoprim, vancomycin) ¨ Medicine to treat seizures (including phenytoin, topiramate, zonisamide) · Some medicines may affect your blood sugar level, including a diuretic (water pill), birth control pills, corticosteroid (including dexamethasone, hydrocortisone, methylprednisolone, prednisolone, prednisone), phenothiazine medicine (including chlorpromazine, perphenazine, prochlorperazine, promethazine, thioridazine), thyroid medicine, niacin, and isoniazid.  Closely monitor your blood sugar levels when you start or stop another medicine while you are using sitagliptin/metformin. · Limit the amount of alcohol you drink while you are using this medicine. Heavy alcohol use increases your risk for lactic acidosis. Warnings While Using This Medicine: · Tell your doctor if you are pregnant or breastfeeding, or if you have kidney disease, liver disease, heart disease, adrenal or pituitary gland disease, high cholesterol, vitamin B12 deficiency, or a history of heart failure or pancreatitis. · This medicine may cause the following problems: 
¨ Lactic acidosis (too much acid in the blood, which can be life-threatening) ¨ Pancreatitis (inflammation of the pancreas) ¨ Heart failure ¨ Kidney problems ¨ Low levels of vitamin B12 in the blood ¨ Low blood sugar ¨ Serious skin reactions · Make sure any doctor or dentist who treats you knows that you are using this medicine. This medicine may interact with the dye used for an x-ray or a CT scan. · Your doctor will do lab tests at regular visits to check on the effects of this medicine. Keep all appointments. · Keep all medicine out of the reach of children. Never share your medicine with anyone. Possible Side Effects While Using This Medicine:  
Call your doctor right away if you notice any of these side effects: · Allergic reaction: Itching or hives, swelling in your face or hands, swelling or tingling in your mouth or throat, chest tightness, trouble breathing · Blistering, peeling, red skin rash · Change in how much or how often you urinate · Fast breathing, trouble breathing, nausea and vomiting, lightheadedness, severe weakness, tiredness, confusion · Large, hard skin blisters · Paleness, feeling tired or weak · Severe joint pain · Shaking, trembling, sweating, fast or pounding heartbeat, faintness, hunger, confusion · Sudden and severe stomach pain, nausea, vomiting, fever, lightheadedness · Swelling in your hands, ankles, or feet · Trouble breathing, cold sweat, bluish-colored skin If you notice these less serious side effects, talk with your doctor: · Cough, stuffy or runny nose, sore throat · Diarrhea If you notice other side effects that you think are caused by this medicine, tell your doctor. Call your doctor for medical advice about side effects. You may report side effects to FDA at 9-756-LCK-2297 © 2017 [address] Information is for End User's use only and may not be sold, redistributed or otherwise used for commercial purposes. The above information is an  only. It is not intended as medical advice for individual conditions or treatments. Talk to your doctor, nurse or pharmacist before following any medical regimen to see if it is safe and effective for you. Introducing hospitals & [facility] SERVICES! Saman Pagan introduces [employer] patient portal. Now you can access parts of your medical record, email your doctor's office, and request medication refills online. 1. In your internet browser, go to [email or web address] 2. Click on the First Time User? Click Here link in the Sign In box. You will see the New Member Sign Up page. 3. Enter your [employer] Access Code exactly as it appears below. You will not need to use this code after youve completed the sign-up process. If you do not sign up before the expiration date, you must request a new code. · [employer] Access Code: [email or web address] Expires: 7/10/2018 11:18 AM 
 
4. Enter the last four digits of your Social Security Number (xxxx) and Date of Birth (mm/dd/yyyy) as indicated and click Submit. You will be taken to the next sign-up page. 5. Create a [employer] ID. This will be your [employer] login ID and cannot be changed, so think of one that is secure and easy to remember. 6. Create a [employer] password. You can change your password at any time. 7. Enter your Password Reset Question and Answer.  This can be used at a later time if you forget your password. 8. Enter your e-mail address. You will receive e-mail notification when new information is available in [address] 9. Click Sign Up. You can now view and download portions of your medical record. 10. Click the Download Summary menu link to download a portable copy of your medical information. If you have questions, please visit the Frequently Asked Questions section of the Engrade website. Remember, [employer] is NOT to be used for urgent needs. For medical emergencies, dial 911. Now available from your iPhone and Android! Please provide this summary of care documentation to your next provider. Your primary care clinician is listed as Shanell Motta. If you have any questions after today's visit, please call [phone].

## 2018-04-11 NOTE — PROGRESS NOTES
Chief Complaint   Patient presents with    Diabetes     patient states BG has been elevated, consistently over 250.  Cholesterol Problem     high chol    Obesity       Health Maintenance Due   Topic Date Due    Pneumococcal 19-64 Medium Risk (1 of 1 - PPSV23) 08/08/2005    DTaP/Tdap/Td series (1 - Tdap) 08/08/2007    EYE EXAM RETINAL OR DILATED Q1  12/04/2015    Influenza Age 9 to Adult  08/01/2017    FOOT EXAM Q1  02/07/2018    MICROALBUMIN Q1  02/07/2018    HEMOGLOBIN A1C Q6M  02/11/2018       Health Maintenance reviewed     1. Have you been to the ER, urgent care clinic since your last visit? Hospitalized since your last visit? No    2. Have you seen or consulted any other health care providers outside of the [address] [place] [address] since your last visit? Include any pap smears or colon screening.  No

## 2018-04-11 NOTE — PATIENT INSTRUCTIONS
Please contact our office if you have any questions about your visit today. 1.) Stop the Metformin. 2.) Start on Janumet XR once a day. 3.) Return in 2 weeks for follow up. Sitagliptin/Metformin (By mouth)   Metformin Hydrochloride (met-FOR-min craig-droe-KLOR-mary), Sitagliptin Phosphate (sit-a-GLIP-tin FOS-fate)  Treats type 2 diabetes. Brand Name(s): Oleumet, Janumet XR   There may be other brand names for this medicine. When This Medicine Should Not Be Used: This medicine is not right for everyone. Do not use it if you had an allergic reaction to metformin or sitagliptin. How to Use This Medicine:   Tablet, Long Acting Tablet  · Take your medicine as directed. Your dose may need to be changed several times to find what works best for you. · It is best to take this medicine with food or milk. · Extended-release tablet:   ¨ Take this medicine in the evening. ¨ You may see tablets in your stools. If this happens several times, tell your doctor right away. · Swallow the tablet whole. Do not crush, break, or chew it. · Drink plenty of fluids to help prevent dehydration. · This medicine should come with a Medication Guide. Ask your pharmacist for a copy if you do not have one. · Missed dose: Take a dose as soon as you remember. If it is almost time for your next dose, wait until then and take a regular dose. Do not take extra medicine to make up for a missed dose. · Store the medicine in a closed container at room temperature, away from heat, moisture, and direct light. Drugs and Foods to Avoid:   Ask your doctor or pharmacist before using any other medicine, including over-the-counter medicines, vitamins, and herbal products. · Some medicines can affect how metformin/sitagliptin works.  Tell your doctor if you are using any of the following:   ¨ Cimetidine, digoxin, dolutegravir, morphine, phenytoin, quinine, ranitidine, ranolazine, vandetanib  ¨ Blood pressure medicine (including amiloride)  ¨ Diuretic (water pill, including acetazolamide, dichlorphenamide, triamterene)  ¨ Insulin or other diabetes medicine  ¨ Medicine for heart rhythm changes (including procainamide, quinidine)  ¨ Medicine to treat infections (including isoniazid, rifampin, trimethoprim, vancomycin)  ¨ Medicine to treat seizures (including phenytoin, topiramate, zonisamide)  · Some medicines may affect your blood sugar level, including a diuretic (water pill), birth control pills, corticosteroid (including dexamethasone, hydrocortisone, methylprednisolone, prednisolone, prednisone), phenothiazine medicine (including chlorpromazine, perphenazine, prochlorperazine, promethazine, thioridazine), thyroid medicine, niacin, and isoniazid. Closely monitor your blood sugar levels when you start or stop another medicine while you are using sitagliptin/metformin. · Limit the amount of alcohol you drink while you are using this medicine. Heavy alcohol use increases your risk for lactic acidosis. Warnings While Using This Medicine:   · Tell your doctor if you are pregnant or breastfeeding, or if you have kidney disease, liver disease, heart disease, adrenal or pituitary gland disease, high cholesterol, vitamin B12 deficiency, or a history of heart failure or pancreatitis. · This medicine may cause the following problems:  ¨ Lactic acidosis (too much acid in the blood, which can be life-threatening)  ¨ Pancreatitis (inflammation of the pancreas)  ¨ Heart failure  ¨ Kidney problems  ¨ Low levels of vitamin B12 in the blood  ¨ Low blood sugar  ¨ Serious skin reactions  · Make sure any doctor or dentist who treats you knows that you are using this medicine. This medicine may interact with the dye used for an x-ray or a CT scan. · Your doctor will do lab tests at regular visits to check on the effects of this medicine. Keep all appointments. · Keep all medicine out of the reach of children.  Never share your medicine with anyone. Possible Side Effects While Using This Medicine:   Call your doctor right away if you notice any of these side effects:  · Allergic reaction: Itching or hives, swelling in your face or hands, swelling or tingling in your mouth or throat, chest tightness, trouble breathing  · Blistering, peeling, red skin rash  · Change in how much or how often you urinate  · Fast breathing, trouble breathing, nausea and vomiting, lightheadedness, severe weakness, tiredness, confusion  · Large, hard skin blisters  · Paleness, feeling tired or weak  · Severe joint pain  · Shaking, trembling, sweating, fast or pounding heartbeat, faintness, hunger, confusion  · Sudden and severe stomach pain, nausea, vomiting, fever, lightheadedness  · Swelling in your hands, ankles, or feet  · Trouble breathing, cold sweat, bluish-colored skin  If you notice these less serious side effects, talk with your doctor:   · Cough, stuffy or runny nose, sore throat  · Diarrhea  If you notice other side effects that you think are caused by this medicine, tell your doctor. Call your doctor for medical advice about side effects. You may report side effects to FDA at 3-043-FDA-1458  © 2017 [address]  Information is for End User's use only and may not be sold, redistributed or otherwise used for commercial purposes. The above information is an  only. It is not intended as medical advice for individual conditions or treatments. Talk to your doctor, nurse or pharmacist before following any medical regimen to see if it is safe and effective for you.

## 2018-04-11 NOTE — PROGRESS NOTES
Progress Note    Patient: Darren Freeman MRN: [id number]  SSN: [id number]    YOB: 1985  Age: 33 y.o. Sex: male          Subjective:   Darren Freeman is a 33 y.o. male who is here for follow up. The patient mentions that his BS have been running in the 270's fasting. The patient states that he is tolerating the 750 mg dose but he believes that his BS is not well controlled. The patient had not been exercising as consistently. He also admits to not exercising regularly. The patient also admits to eating a lot of carbohydrates. He denies any CP, headache, nausea or diarrhea currently. Visit from 10/31/17  The patient previously was having diarrhea and nausea with the BID Metformin 1000 mg. He states that when he increased the dose he felt very nauseous. He recently was switched to the Metformin 750 XR once a day and he has tolerated it well so far. When he started back on Metformin his BS dropped down to 110 fasting. He has not checked his BS recently. He also mentions that he took a break for a week from his regular exercise regimen. He was sore during this time and took this time off. He mentions that he recently started doing some bench presses but nothing else. The patient develops cramping when he walks a lot. He states that when he goes into the mall he can get these cramping episodes. He states that when he is at rest or asleep he does NOT get the cramps. He denies getting enough fluid prior to walking long distances. He states that the muscles will lock up in his calves. The patient also notes that he has had some family members who have had gastric bypass with varying results. Some of his relatives actually regained some of the weight that they lost back.      Objective:     Past Medical History:   Diagnosis Date    IBS (irritable bowel syndrome)       Visit Vitals    /70 (BP 1 Location: Left arm, BP Patient Position: Sitting)    Pulse 84    Temp 99 °F (37.2 °C) (Oral)  Resp 20    Ht 5' 8\" (1.727 m)    Wt (!) 380 lb 4 oz (172.5 kg)    BMI 57.82 kg/m2       Review of Systems:  Pertinent ROS noted in HPI    Physical Exam:     GENERAL: alert, cooperative, no distress, appears stated age, morbidly obese  EYE: conjunctivae/corneas clear. PERRL, EOM's intact. Fundi benign  LYMPHATIC: Cervical, supraclavicular, and axillary nodes normal.   THROAT & NECK: normal and no erythema or exudates noted. LUNG: clear to auscultation bilaterally  HEART: regular rate and rhythm, S1, S2 normal, no murmur, click, rub or gallop  ABDOMEN: soft, non-tender. Bowel sounds normal. No masses,  no organomegaly, abnormal findings:  obese  EXTREMITIES:  extremities normal, atraumatic, no cyanosis or edema. Feet examined---no lesions. SKIN: Normal.        Lab/Data Review:  Labs ordered as noted below     Assessment:     1.) Diabetes Mellitus Type 2: The patient switched to [facility]. I will get HgbA1C ordered. The patient will also continue to exercise and watch his diet. 2.) Hypertriglyceridemia: Lipid Panel. Patient will continue on simvastatin. 3.) Vitamin D Deficiency: Vitamin D level ordered. 4.) Obesity: Patient will continue to engage in lifestyle change. Patient will return in 2 weeks for follow up on new medications.        Plan:     Orders Placed This Encounter    SITagliptin-metFORMIN (JANUMET XR) 100-1,000 mg TM24         Signed By: Supa Guy DO     April 11, 2018

## 2018-04-27 ENCOUNTER — OFFICE VISIT (OUTPATIENT)
Dept: FAMILY MEDICINE CLINIC | Age: 33
End: 2018-04-27

## 2018-04-27 ENCOUNTER — HOSPITAL ENCOUNTER (OUTPATIENT)
Dept: LAB | Age: 33
Discharge: HOME | End: 2018-04-27
Payer: COMMERCIAL

## 2018-04-27 VITALS
DIASTOLIC BLOOD PRESSURE: 63 MMHG | SYSTOLIC BLOOD PRESSURE: 109 MMHG | TEMPERATURE: 98.5 F | HEART RATE: 79 BPM | RESPIRATION RATE: 16 BRPM | WEIGHT: 315 LBS | HEIGHT: 68 IN | BODY MASS INDEX: 47.74 KG/M2

## 2018-04-27 DIAGNOSIS — E11.69 DIABETES MELLITUS TYPE 2 IN OBESE (HCC): ICD-10-CM

## 2018-04-27 DIAGNOSIS — E66.9 DIABETES MELLITUS TYPE 2 IN OBESE (HCC): ICD-10-CM

## 2018-04-27 DIAGNOSIS — E78.1 HYPERTRIGLYCERIDEMIA: ICD-10-CM

## 2018-04-27 DIAGNOSIS — E55.9 VITAMIN D DEFICIENCY: ICD-10-CM

## 2018-04-27 DIAGNOSIS — E66.01 MORBID OBESITY DUE TO EXCESS CALORIES (HCC): ICD-10-CM

## 2018-04-27 LAB
ALBUMIN SERPL-MCNC: 3.7 G/DL (ref 3.4–5)
ALBUMIN/GLOB SERPL: 1.1 {RATIO} (ref 0.8–1.7)
ALP SERPL-CCNC: 94 U/L (ref 45–117)
ALT SERPL-CCNC: 44 U/L (ref 16–61)
ANION GAP SERPL CALC-SCNC: 8 MMOL/L (ref 3–18)
AST SERPL-CCNC: 20 U/L (ref 15–37)
BILIRUB SERPL-MCNC: 0.7 MG/DL (ref 0.2–1)
BUN SERPL-MCNC: 13 MG/DL (ref 7–18)
BUN/CREAT SERPL: 13 (ref 12–20)
CALCIUM SERPL-MCNC: 8.6 MG/DL (ref 8.5–10.1)
CHLORIDE SERPL-SCNC: 103 MMOL/L (ref 100–108)
CHOLEST SERPL-MCNC: 104 MG/DL
CO2 SERPL-SCNC: 28 MMOL/L (ref 21–32)
CREAT SERPL-MCNC: 0.99 MG/DL (ref 0.6–1.3)
EST. AVERAGE GLUCOSE BLD GHB EST-MCNC: 177 MG/DL
GLOBULIN SER CALC-MCNC: 3.5 G/DL (ref 2–4)
GLUCOSE SERPL-MCNC: 131 MG/DL (ref 74–99)
HBA1C MFR BLD: 7.8 % (ref 4.2–5.6)
HDLC SERPL-MCNC: 39 MG/DL (ref 40–60)
HDLC SERPL: 2.7 {RATIO} (ref 0–5)
LDLC SERPL CALC-MCNC: 40.8 MG/DL (ref 0–100)
LIPID PROFILE,FLP: ABNORMAL
POTASSIUM SERPL-SCNC: 4.5 MMOL/L (ref 3.5–5.5)
PROT SERPL-MCNC: 7.2 G/DL (ref 6.4–8.2)
SODIUM SERPL-SCNC: 139 MMOL/L (ref 136–145)
TRIGL SERPL-MCNC: 121 MG/DL (ref ?–150)
VLDLC SERPL CALC-MCNC: 24.2 MG/DL

## 2018-04-27 PROCEDURE — 83036 HEMOGLOBIN GLYCOSYLATED A1C: CPT | Performed by: INTERNAL MEDICINE

## 2018-04-27 PROCEDURE — 82306 VITAMIN D 25 HYDROXY: CPT | Performed by: INTERNAL MEDICINE

## 2018-04-27 PROCEDURE — 80053 COMPREHEN METABOLIC PANEL: CPT | Performed by: INTERNAL MEDICINE

## 2018-04-27 PROCEDURE — 36415 COLL VENOUS BLD VENIPUNCTURE: CPT | Performed by: INTERNAL MEDICINE

## 2018-04-27 PROCEDURE — 80061 LIPID PANEL: CPT | Performed by: INTERNAL MEDICINE

## 2018-04-27 NOTE — PROGRESS NOTES
Chief Complaint   Patient presents with    Follow-up     2 week fu    Diabetes    Cholesterol Problem     high chol    Vitamin D Deficiency    Obesity       1. Have you been to the ER, urgent care clinic since your last visit? Hospitalized since your last visit? No    2. Have you seen or consulted any other health care providers outside of the [address] [place] [address] since your last visit? Include any pap smears or colon screening.  No

## 2018-04-27 NOTE — MR AVS SNAPSHOT
[address] [place] [address] 
 
 
 [address] [place] [address] 
[phone] Patient: Shanique Carl MRN: [id number] Visit Information Date & Time Provider Department Dept. Phone Encounter #  
 4/27/2018  8:15 AM Fred Owens, Becky Callahan 77 [id number] Follow-up Instructions Return in about 3 months (around 7/27/2018) for Regular Follow Up . Upcoming Health Maintenance Date Due Pneumococcal 19-64 Medium Risk (1 of 1 - PPSV23) 8/8/2005 DTaP/Tdap/Td series (1 - Tdap) 8/8/2007 EYE EXAM RETINAL OR DILATED Q1 12/4/2015 FOOT EXAM Q1 2/7/2018 MICROALBUMIN Q1 2/7/2018 HEMOGLOBIN A1C Q6M 2/11/2018 Influenza Age 5 to Adult 8/1/2018 LIPID PANEL Q1 8/11/2018 Allergies as of 4/27/2018  Review Complete On: 4/27/2018 By: Fred Owens, DO No Known Allergies Current Immunizations  Never Reviewed Name Date Influenza Vaccine 9/1/2014 Not reviewed this visit You Were Diagnosed With   
  
 Codes Comments Diabetes mellitus type 2 in obese [facility])     ICD-10-CM: E11.69, E66.9 ICD-9-CM: 250.00, 278.00 Morbid obesity due to excess calories (HCC)     ICD-10-CM: E66.01 
ICD-9-CM: 278.01 Vitamin D deficiency     ICD-10-CM: E55.9 ICD-9-CM: 268.9 Hypertriglyceridemia     ICD-10-CM: E78.1 ICD-9-CM: 272.1 Vitals BP Pulse Temp Resp Height(growth percentile) Weight(growth percentile) 109/63 (BP 1 Location: Left arm, BP Patient Position: Sitting) 79 98.5 °F (36.9 °C) (Oral) 16 5' 8\" (1.727 m) (!) 382 lb 12 oz (173.6 kg) BMI Smoking Status 58.2 kg/m2 Never Smoker BMI and BSA Data Body Mass Index Body Surface Area  
 58.2 kg/m 2 2.89 m 2 Preferred Pharmacy Pharmacy Name Phone [facility] [phone] [phone] Your Updated Medication List  
 This list is accurate as of 4/27/18  9:00 AM.  Always use your most recent med list.  
  
  
  
  
 diclofenac 1 % Gel Commonly known as:  VOLTAREN Apply 4 g to affected area four (4) times daily as needed for Pain.  
  
 glucose blood VI test strips strip Commonly known as:  CONTOUR NEXT TEST STRIPS Check Blood Sugar after largest meal of the day. Lancets Misc Commonly known as:  Darnelle Hind Check blood sugar after largest meal of the day  
  
 simvastatin 10 mg tablet Commonly known as:  ZOCOR  
TAKE 1 TABLET BY MOUTH EVERY NIGHT AT BEDTIME FOR HYPERTRIGLYCERIDEMIA SITagliptin-metFORMIN 100-1,000 mg Tm24 Commonly known as:  JANUMET XR Take 100 mg by mouth daily. Start taking on:  5/10/2018 Prescriptions Printed Refills SITagliptin-metFORMIN (JANUMET XR) 100-1,000 mg TM24 3 Starting on: 5/10/2018 Sig: Take 100 mg by mouth daily. Class: Print Route: Oral  
  
Follow-up Instructions Return in about 3 months (around 7/27/2018) for Regular Follow Up . Patient Instructions Please contact our office if you have any questions about your visit today. 1.) Please check blood sugar when you start to feel dizzy. Avoid any blood sugars below 70 mg/dL. 2.) Return in 3 months for follow up. Learning About Cutting Calories How do calories affect your weight? Food gives your body energy. Energy from the food you eat is measured in calories. This energy keeps your heart beating, your brain active, and your muscles working. Your body needs a certain number of calories each day. After your body uses the calories it needs, it stores extra calories as fat. To lose weight safely, you have to eat fewer calories while eating in a healthy way. How many calories do you need each day? The more active you are, the more calories you need.  When you are less active, you need fewer calories. How many calories you need each day also depends on several things, including your age and whether you are male or female. Here are some general guidelines for adults: 
· Less active women and older adults need 1,600 to 2,000 calories each day. · Active women and less active men need 2,000 to 2,400 calories each day. · Active men need 2,400 to 3,000 calories each day. How can you cut calories and eat healthy meals? Whole grains, vegetables and fruits, and dried beans are good lower-calorie foods. They give you lots of nutrients and fiber. And they fill you up. Sweets, energy drinks, and soda pop are high in calories. They give you few nutrients and no fiber. Try to limit soda pop, fruit juice, and energy drinks. Drink water instead. Some fats can be part of a healthy diet. But cutting back on fats from highly processed foods like fast foods and many snack foods is a good way to lower the calories in your diet. Also, use smaller amounts of fats like butter, margarine, salad dressing, and mayonnaise. Add fresh garlic, lemon, or herbs to your meals to add flavor without adding fat. Meats and dairy products can be a big source of hidden fats. Try to choose lean or low-fat versions of these products. Fat-free cookies, candies, chips, and frozen treats can still be high in sugar and calories. Some fat-free foods have more calories than regular ones. Eat fat-free treats in moderation, as you would other foods. If your favorite foods are high in fat, salt, sugar, or calories, limit how often you eat them. Eat smaller servings, or look for healthy substitutes. Fill up on fruits, vegetables, and whole grains. Eating at home · Use meat as a side dish instead of as the main part of your meal. 
· Try main dishes that use whole wheat pasta, brown rice, dried beans, or vegetables. · Find ways to cook with little or no fat, such as broiling, steaming, or grilling. · Use cooking spray instead of oil. If you use oil, use a monounsaturated oil, such as canola or olive oil. · Trim fat from meats before you cook them. · Drain off fat after you brown the meat or while you roast it. · Chill soups and stews after you cook them. Then skim the fat off the top after it hardens. Eating out · Order foods that are broiled or poached rather than fried or breaded. · Cut back on the amount of butter or margarine that you use on bread. · Order sauces, gravies, and salad dressings on the side, and use only a little. · When you order pasta, choose tomato sauce rather than cream sauce. · Ask for salsa with your baked potato instead of sour cream, butter, cheese, or evans. · Order meals in a small size instead of upgrading to a large. · Share an entree, or take part of your food home to eat as another meal. 
· Share appetizers and desserts. Where can you learn more? Go to http://julio-toby.info/. Enter 99 [id number] in the search box to learn more about \"Learning About Cutting Calories. \" Current as of: May 12, 2017 Content Version: 11.4 © 6985-5837 [employer]wise[employer] Incorporated. Care instructions adapted under license by [employer] (which disclaims liability or warranty for this information). If you have questions about a medical condition or this instruction, always ask your healthcare professional. [place] [address] any warranty or liability for your use of this information. Learning About Low-Carbohydrate Diets for Weight Loss What is a low-carbohydrate diet? Low-carb diets avoid foods that are high in carbohydrate. These high-carb foods include pasta, bread, rice, cereal, fruits, and starchy vegetables. Instead, these diets usually have you eat foods that are high in fat and protein. Many people lose weight quickly on a low-carb diet. But the early weight loss is water.  People on this diet often gain the weight back after they start eating carbs again. Not all diet plans are safe or work well. A lot of the evidence shows that low-carb diets aren't healthy. That's because these diets often don't include healthy foods like fruits and vegetables. Losing weight safely means balancing protein, fat, and carbs with every meal and snack. And low-carb diets don't always provide the vitamins, minerals, and fiber you need. If you have a serious medical condition, talk to your doctor before you try any diet. These conditions include kidney disease, heart disease, type 2 diabetes, high cholesterol, and high blood pressure. If you are pregnant, it may not be safe for your baby if you are on a low-carb diet. How can you lose weight safely? You might have heard that a diet plan helped another person lose weight. But that doesn't mean that it will work for you. It is very hard to stay on a diet that includes lots of big changes in your eating habits. If you want to get to a healthy weight and stay there, making healthy lifestyle changes will often work better than dieting. These steps can help. · Make a plan for change. Work with your doctor to create a plan that is right for you. · See a dietitian. He or she can show you how to make healthy changes in your eating habits. · Manage stress. If you have a lot of stress in your life, it can be hard to focus on making healthy changes to your daily habits. · Track your food and activity. You are likely to do better at losing weight if you keep track of what you eat and what you do. Follow-up care is a key part of your treatment and safety. Be sure to make and go to all appointments, and call your doctor if you are having problems. It's also a good idea to know your test results and keep a list of the medicines you take. Where can you learn more? Go to http://julio-toby.info/.  
Enter [phone] [id number] 29 in the search box to learn more about \"Learning About Low-Carbohydrate Diets for Weight Loss. \" Current as of: May 12, 2017 Content Version: 11.4 © 2363-1854 [employer]. Care instructions adapted under license by [employer] (which disclaims liability or warranty for this information). If you have questions about a medical condition or this instruction, always ask your healthcare professional. [address] any warranty or liability for your use of this information. Starting a Weight Loss Plan: Care Instructions Your Care Instructions If you are thinking about losing weight, it can be hard to know where to start. Your doctor can help you set up a weight loss plan that best meets your needs. You may want to take a class on nutrition or exercise, or join a weight loss support group. If you have questions about how to make changes to your eating or exercise habits, ask your doctor about seeing a registered dietitian or an exercise specialist. 
It can be a big challenge to lose weight. But you do not have to make huge changes at once. Make small changes, and stick with them. When those changes become habit, add a few more changes. If you do not think you are ready to make changes right now, try to pick a date in the future. Make an appointment to see your doctor to discuss whether the time is right for you to start a plan. Follow-up care is a key part of your treatment and safety. Be sure to make and go to all appointments, and call your doctor if you are having problems. It's also a good idea to know your test results and keep a list of the medicines you take. How can you care for yourself at home? · Set realistic goals. Many people expect to lose much more weight than is likely. A weight loss of 5% to 10% of your body weight may be enough to improve your health. · Get family and friends involved to provide support. Talk to them about why you are trying to lose weight, and ask them to help.  They can help by participating in exercise and having meals with you, even if they may be eating something different. · Find what works best for you. If you do not have time or do not like to cook, a program that offers meal replacement bars or shakes may be better for you. Or if you like to prepare meals, finding a plan that includes daily menus and recipes may be best. 
· Ask your doctor about other health professionals who can help you achieve your weight loss goals. ¨ A dietitian can help you make healthy changes in your diet. ¨ An exercise specialist or  can help you develop a safe and effective exercise program. 
¨ A counselor or psychiatrist can help you cope with issues such as depression, anxiety, or family problems that can make it hard to focus on weight loss. · Consider joining a support group for people who are trying to lose weight. Your doctor can suggest groups in your area. Where can you learn more? Go to http://julio-toby.info/. Enter O710 in the search box to learn more about \"Starting a Weight Loss Plan: Care Instructions. \" Current as of: October 13, 2016 Content Version: 11.4 © 8937-8728 [employer]. Care instructions adapted under license by [employer] (which disclaims liability or warranty for this information). If you have questions about a medical condition or this instruction, always ask your healthcare professional. [address] any warranty or liability for your use of this information. Introducing \[facility] [place] [facility]\"" & HEALTH SERVICES! Danuta Amador introduces [facility] patient portal. Now you can access parts of your medical record, email your doctor's office, and request medication refills online. 1. In your internet browser, go to [email or web address] [email or web address] 2. Click on the First Time User? Click Here link in the Sign In box. You will see the New Member Sign Up page. 3. Enter your Picwing Access Code exactly as it appears below. You will not need to use this code after youve completed the sign-up process. If you do not sign up before the expiration date, you must request a new code. · Picwing Access Code: [email or web address] Expires: 7/10/2018 11:18 AM 
 
4. Enter the last four digits of your Social Security Number (xxxx) and Date of Birth (mm/dd/yyyy) as indicated and click Submit. You will be taken to the next sign-up page. 5. Create a Picwing ID. This will be your Picwing login ID and cannot be changed, so think of one that is secure and easy to remember. 6. Create a Picwing password. You can change your password at any time. 7. Enter your Password Reset Question and Answer. This can be used at a later time if you forget your password. 8. Enter your e-mail address. You will receive e-mail notification when new information is available in [address]. 9. Click Sign Up. You can now view and download portions of your medical record. 10. Click the Download Summary menu link to download a portable copy of your medical information. If you have questions, please visit the Frequently Asked Questions section of the Picwing website. Remember, Picwing is NOT to be used for urgent needs. For medical emergencies, dial 911. Now available from your iPhone and Android! Please provide this summary of care documentation to your next provider. Your primary care clinician is listed as Ping Frias. If you have any questions after today's visit, please call [phone].

## 2018-04-27 NOTE — PROGRESS NOTES
Progress Note    Patient: Alonso Camacho MRN: [id number]  SSN: [id number]    YOB: 1985  Age: 33 y.o. Sex: male          Subjective:   Alonso Camacho is a 33 y.o. male who is here for follow up. He mentions that his BS is down to 140 mg/dL since starting new medication. He states that he has been trying to work out 4 times a week. The patient also happens to have a hard time falling asleep because of their  baby. He also mentions that he tolerates that Janumet well--he denies any diarrhea with the medication on board. He mentions that his goal is to get down to 350 lbs. Visit from 2018  The patient mentions that his BS have been running in the 270's fasting. The patient states that he is tolerating the 750 mg dose but he believes that his BS is not well controlled. The patient had not been exercising as consistently. He also admits to not exercising regularly. The patient also admits to eating a lot of carbohydrates. He denies any CP, headache, nausea or diarrhea currently. Visit from 10/31/17  The patient previously was having diarrhea and nausea with the BID Metformin 1000 mg. He states that when he increased the dose he felt very nauseous. He recently was switched to the Metformin 750 XR once a day and he has tolerated it well so far. When he started back on Metformin his BS dropped down to 110 fasting. He has not checked his BS recently. He also mentions that he took a break for a week from his regular exercise regimen. He was sore during this time and took this time off. He mentions that he recently started doing some bench presses but nothing else. The patient develops cramping when he walks a lot. He states that when he goes into the mall he can get these cramping episodes. He states that when he is at rest or asleep he does NOT get the cramps. He denies getting enough fluid prior to walking long distances. He states that the muscles will lock up in his calves. The patient also notes that he has had some family members who have had gastric bypass with varying results. Some of his relatives actually regained some of the weight that they lost back. Objective:     Past Medical History:   Diagnosis Date    IBS (irritable bowel syndrome)       Visit Vitals    /63 (BP 1 Location: Left arm, BP Patient Position: Sitting)    Pulse 79    Temp 98.5 °F (36.9 °C) (Oral)    Resp 16    Ht 5' 8\" (1.727 m)    Wt (!) 382 lb 12 oz (173.6 kg)    BMI 58.2 kg/m2     Review of Systems:  Pertinent ROS noted in HPI    Physical Exam:     GENERAL: alert, cooperative, no distress, appears stated age, morbidly obese  EYE: conjunctivae/corneas clear. PERRL, EOM's intact. Fundi benign  LYMPHATIC: Cervical, supraclavicular, and axillary nodes normal.   THROAT & NECK: normal and no erythema or exudates noted. LUNG: clear to auscultation bilaterally  HEART: regular rate and rhythm, S1, S2 normal, no murmur, click, rub or gallop  ABDOMEN: soft, non-tender. Bowel sounds normal. No masses,  no organomegaly, abnormal findings:  obese  EXTREMITIES:  extremities normal, atraumatic, no cyanosis or edema. Feet examined---no lesions. SKIN: Normal.        Lab/Data Review:  Labs ordered as noted below     Assessment:     1.) Diabetes Mellitus Type 2: The patient is improved with Janumet. I will refill this today and provide him with the physical script. He will get this filled in May.    2.) Preventive: Labs drawn in the office today. Patient will return in 3 months for follow up on new medications.      Plan:     Orders Placed This Encounter    SITagliptin-metFORMIN (JANUMET XR) 100-1,000 mg TM24         Signed By: Brenna Jeans, DO     April 27, 2018

## 2018-04-27 NOTE — PATIENT INSTRUCTIONS
Please contact our office if you have any questions about your visit today. 1.) Please check blood sugar when you start to feel dizzy. Avoid any blood sugars below 70 mg/dL. 2.) Return in 3 months for follow up. Learning About Cutting Calories  How do calories affect your weight? Food gives your body energy. Energy from the food you eat is measured in calories. This energy keeps your heart beating, your brain active, and your muscles working. Your body needs a certain number of calories each day. After your body uses the calories it needs, it stores extra calories as fat. To lose weight safely, you have to eat fewer calories while eating in a healthy way. How many calories do you need each day? The more active you are, the more calories you need. When you are less active, you need fewer calories. How many calories you need each day also depends on several things, including your age and whether you are male or female. Here are some general guidelines for adults:  · Less active women and older adults need 1,600 to 2,000 calories each day. · Active women and less active men need 2,000 to 2,400 calories each day. · Active men need 2,400 to 3,000 calories each day. How can you cut calories and eat healthy meals? Whole grains, vegetables and fruits, and dried beans are good lower-calorie foods. They give you lots of nutrients and fiber. And they fill you up. Sweets, energy drinks, and soda pop are high in calories. They give you few nutrients and no fiber. Try to limit soda pop, fruit juice, and energy drinks. Drink water instead. Some fats can be part of a healthy diet. But cutting back on fats from highly processed foods like fast foods and many snack foods is a good way to lower the calories in your diet. Also, use smaller amounts of fats like butter, margarine, salad dressing, and mayonnaise. Add fresh garlic, lemon, or herbs to your meals to add flavor without adding fat.   Meats and dairy products can be a big source of hidden fats. Try to choose lean or low-fat versions of these products. Fat-free cookies, candies, chips, and frozen treats can still be high in sugar and calories. Some fat-free foods have more calories than regular ones. Eat fat-free treats in moderation, as you would other foods. If your favorite foods are high in fat, salt, sugar, or calories, limit how often you eat them. Eat smaller servings, or look for healthy substitutes. Fill up on fruits, vegetables, and whole grains. Eating at home  · Use meat as a side dish instead of as the main part of your meal.  · Try main dishes that use whole wheat pasta, brown rice, dried beans, or vegetables. · Find ways to cook with little or no fat, such as broiling, steaming, or grilling. · Use cooking spray instead of oil. If you use oil, use a monounsaturated oil, such as canola or olive oil. · Trim fat from meats before you cook them. · Drain off fat after you brown the meat or while you roast it. · Chill soups and stews after you cook them. Then skim the fat off the top after it hardens. Eating out  · Order foods that are broiled or poached rather than fried or breaded. · Cut back on the amount of butter or margarine that you use on bread. · Order sauces, gravies, and salad dressings on the side, and use only a little. · When you order pasta, choose tomato sauce rather than cream sauce. · Ask for salsa with your baked potato instead of sour cream, butter, cheese, or evans. · Order meals in a small size instead of upgrading to a large. · Share an entree, or take part of your food home to eat as another meal.  · Share appetizers and desserts. Where can you learn more? Go to http://julio-toby.info/. Enter 99 [id number] in the search box to learn more about \"Learning About Cutting Calories. \"  Current as of: May 12, 2017  Content Version: 11.4  © 1159-7458 [employer].  Care instructions adapted under license by [employer] (which disclaims liability or warranty for this information). If you have questions about a medical condition or this instruction, always ask your healthcare professional. [address] any warranty or liability for your use of this information. Learning About Low-Carbohydrate Diets for Weight Loss  What is a low-carbohydrate diet? Low-carb diets avoid foods that are high in carbohydrate. These high-carb foods include pasta, bread, rice, cereal, fruits, and starchy vegetables. Instead, these diets usually have you eat foods that are high in fat and protein. Many people lose weight quickly on a low-carb diet. But the early weight loss is water. People on this diet often gain the weight back after they start eating carbs again. Not all diet plans are safe or work well. A lot of the evidence shows that low-carb diets aren't healthy. That's because these diets often don't include healthy foods like fruits and vegetables. Losing weight safely means balancing protein, fat, and carbs with every meal and snack. And low-carb diets don't always provide the vitamins, minerals, and fiber you need. If you have a serious medical condition, talk to your doctor before you try any diet. These conditions include kidney disease, heart disease, type 2 diabetes, high cholesterol, and high blood pressure. If you are pregnant, it may not be safe for your baby if you are on a low-carb diet. How can you lose weight safely? You might have heard that a diet plan helped another person lose weight. But that doesn't mean that it will work for you. It is very hard to stay on a diet that includes lots of big changes in your eating habits. If you want to get to a healthy weight and stay there, making healthy lifestyle changes will often work better than dieting. These steps can help. · Make a plan for change.  Work with your doctor to create a plan that is right for you.  · See a dietitian. He or she can show you how to make healthy changes in your eating habits. · Manage stress. If you have a lot of stress in your life, it can be hard to focus on making healthy changes to your daily habits. · Track your food and activity. You are likely to do better at losing weight if you keep track of what you eat and what you do. Follow-up care is a key part of your treatment and safety. Be sure to make and go to all appointments, and call your doctor if you are having problems. It's also a good idea to know your test results and keep a list of the medicines you take. Where can you learn more? Go to http://julio-toby.info/. Enter A121 in the search box to learn more about \"Learning About Low-Carbohydrate Diets for Weight Loss. \"  Current as of: May 12, 2017  Content Version: 11.4  © 6655-7752 [employer]. Care instructions adapted under license by "[employer]" (which disclaims liability or warranty for this information). If you have questions about a medical condition or this instruction, always ask your healthcare professional. [address] any warranty or liability for your use of this information. Starting a Weight Loss Plan: Care Instructions  Your Care Instructions    If you are thinking about losing weight, it can be hard to know where to start. Your doctor can help you set up a weight loss plan that best meets your needs. You may want to take a class on nutrition or exercise, or join a weight loss support group. If you have questions about how to make changes to your eating or exercise habits, ask your doctor about seeing a registered dietitian or an exercise specialist.  It can be a big challenge to lose weight. But you do not have to make huge changes at once. Make small changes, and stick with them. When those changes become habit, add a few more changes.   If you do not think you are ready to make changes right now, try to pick a date in the future. Make an appointment to see your doctor to discuss whether the time is right for you to start a plan. Follow-up care is a key part of your treatment and safety. Be sure to make and go to all appointments, and call your doctor if you are having problems. It's also a good idea to know your test results and keep a list of the medicines you take. How can you care for yourself at home? · Set realistic goals. Many people expect to lose much more weight than is likely. A weight loss of 5% to 10% of your body weight may be enough to improve your health. · Get family and friends involved to provide support. Talk to them about why you are trying to lose weight, and ask them to help. They can help by participating in exercise and having meals with you, even if they may be eating something different. · Find what works best for you. If you do not have time or do not like to cook, a program that offers meal replacement bars or shakes may be better for you. Or if you like to prepare meals, finding a plan that includes daily menus and recipes may be best.  · Ask your doctor about other health professionals who can help you achieve your weight loss goals. ¨ A dietitian can help you make healthy changes in your diet. ¨ An exercise specialist or  can help you develop a safe and effective exercise program.  ¨ A counselor or psychiatrist can help you cope with issues such as depression, anxiety, or family problems that can make it hard to focus on weight loss. · Consider joining a support group for people who are trying to lose weight. Your doctor can suggest groups in your area. Where can you learn more? Go to http://julio-toby.info/. Enter U277 in the search box to learn more about \"Starting a Weight Loss Plan: Care Instructions. \"  Current as of: October 13, 2016  Content Version: 11.4  © 0963-0708 [employer].  Care instructions adapted under license by [employer] (which disclaims liability or warranty for this information). If you have questions about a medical condition or this instruction, always ask your healthcare professional. [address] any warranty or liability for your use of this information.

## 2018-04-29 LAB — 25(OH)D3 SERPL-MCNC: 21.3 NG/ML (ref 30–100)

## 2018-05-09 DIAGNOSIS — E78.1 HYPERTRIGLYCERIDEMIA: ICD-10-CM

## 2018-05-09 RX ORDER — METFORMIN HYDROCHLORIDE 750 MG/1
TABLET, EXTENDED RELEASE ORAL
Qty: 30 TAB | Refills: 0 | Status: SHIPPED | OUTPATIENT
Start: 2018-05-09

## 2018-05-09 RX ORDER — SIMVASTATIN 10 MG/1
TABLET, FILM COATED ORAL
Qty: 30 TAB | Refills: 0 | Status: SHIPPED | OUTPATIENT
Start: 2018-05-09 | End: 2018-06-06 | Stop reason: SDUPTHER

## 2018-09-07 ENCOUNTER — TELEPHONE (OUTPATIENT)
Dept: FAMILY MEDICINE CLINIC | Age: 33
End: 2018-09-07